# Patient Record
Sex: MALE | Race: WHITE | NOT HISPANIC OR LATINO | Employment: OTHER | URBAN - METROPOLITAN AREA
[De-identification: names, ages, dates, MRNs, and addresses within clinical notes are randomized per-mention and may not be internally consistent; named-entity substitution may affect disease eponyms.]

---

## 2017-01-23 ENCOUNTER — CONSULT (OUTPATIENT)
Dept: ONCOLOGY | Facility: CLINIC | Age: 67
End: 2017-01-23

## 2017-01-23 ENCOUNTER — LAB (OUTPATIENT)
Dept: LAB | Facility: HOSPITAL | Age: 67
End: 2017-01-23

## 2017-01-23 VITALS
RESPIRATION RATE: 15 BRPM | DIASTOLIC BLOOD PRESSURE: 90 MMHG | TEMPERATURE: 98.2 F | SYSTOLIC BLOOD PRESSURE: 143 MMHG | WEIGHT: 211 LBS | HEART RATE: 84 BPM

## 2017-01-23 DIAGNOSIS — D72.828 OTHER ELEVATED WHITE BLOOD CELL (WBC) COUNT: Primary | ICD-10-CM

## 2017-01-23 PROBLEM — D72.829 LEUCOCYTOSIS: Status: ACTIVE | Noted: 2017-01-23

## 2017-01-23 LAB
BASOPHILS # BLD MANUAL: 0 10*3/MM3 (ref 0–0.2)
BASOPHILS NFR BLD AUTO: 0 % (ref 0–1)
CRP SERPL-MCNC: 3.6 MG/DL (ref 0–10)
DEPRECATED RDW RBC AUTO: 46.5 FL (ref 37–54)
EOSINOPHIL # BLD MANUAL: 0.42 10*3/MM3 (ref 0.1–0.3)
EOSINOPHIL NFR BLD MANUAL: 3 % (ref 0–3)
ERYTHROCYTE [DISTWIDTH] IN BLOOD BY AUTOMATED COUNT: 13.4 % (ref 11.3–14.5)
ERYTHROCYTE [SEDIMENTATION RATE] IN BLOOD: 19 MM/HR (ref 0–20)
HCT VFR BLD AUTO: 43.9 % (ref 38.9–50.9)
HGB BLD-MCNC: 15.1 G/DL (ref 13.1–17.5)
LDH SERPL-CCNC: 156 U/L (ref 120–246)
LYMPHOCYTES # BLD MANUAL: 3.35 10*3/MM3 (ref 0.6–4.8)
LYMPHOCYTES NFR BLD MANUAL: 24 % (ref 24–44)
LYMPHOCYTES NFR BLD MANUAL: 6 % (ref 0–12)
MCH RBC QN AUTO: 32.4 PG (ref 27–31)
MCHC RBC AUTO-ENTMCNC: 34.4 G/DL (ref 32–36)
MCV RBC AUTO: 94.2 FL (ref 80–99)
METAMYELOCYTES NFR BLD MANUAL: 1 % (ref 0–0)
MONOCYTES # BLD AUTO: 0.84 10*3/MM3 (ref 0–1)
NEUTROPHILS # BLD AUTO: 9.21 10*3/MM3 (ref 1.5–8.3)
NEUTROPHILS NFR BLD MANUAL: 64 % (ref 41–71)
NEUTS BAND NFR BLD MANUAL: 2 % (ref 0–5)
PLAT MORPH BLD: NORMAL
PLATELET # BLD AUTO: 310 10*3/MM3 (ref 150–450)
PMV BLD AUTO: 9.2 FL (ref 6–12)
RBC # BLD AUTO: 4.66 10*6/MM3 (ref 4.2–5.76)
RBC MORPH BLD: NORMAL
WBC MORPH BLD: NORMAL
WBC NRBC COR # BLD: 13.95 10*3/MM3 (ref 3.5–10.8)

## 2017-01-23 PROCEDURE — 85007 BL SMEAR W/DIFF WBC COUNT: CPT | Performed by: INTERNAL MEDICINE

## 2017-01-23 PROCEDURE — 85027 COMPLETE CBC AUTOMATED: CPT | Performed by: INTERNAL MEDICINE

## 2017-01-23 PROCEDURE — 83615 LACTATE (LD) (LDH) ENZYME: CPT | Performed by: INTERNAL MEDICINE

## 2017-01-23 PROCEDURE — 86140 C-REACTIVE PROTEIN: CPT | Performed by: INTERNAL MEDICINE

## 2017-01-23 PROCEDURE — 99203 OFFICE O/P NEW LOW 30 MIN: CPT | Performed by: INTERNAL MEDICINE

## 2017-01-23 PROCEDURE — 36415 COLL VENOUS BLD VENIPUNCTURE: CPT | Performed by: INTERNAL MEDICINE

## 2017-01-23 PROCEDURE — 85652 RBC SED RATE AUTOMATED: CPT | Performed by: INTERNAL MEDICINE

## 2017-01-23 RX ORDER — OMEPRAZOLE 40 MG/1
40 CAPSULE, DELAYED RELEASE ORAL DAILY
COMMUNITY
End: 2017-05-18 | Stop reason: DRUGHIGH

## 2017-01-23 RX ORDER — LISINOPRIL AND HYDROCHLOROTHIAZIDE 25; 20 MG/1; MG/1
1 TABLET ORAL DAILY
COMMUNITY
End: 2017-05-18 | Stop reason: DRUGHIGH

## 2017-01-23 RX ORDER — ASPIRIN 81 MG/1
325 TABLET ORAL DAILY
COMMUNITY

## 2017-01-23 RX ORDER — TADALAFIL 10 MG/1
10 TABLET ORAL DAILY PRN
COMMUNITY

## 2017-01-23 NOTE — PROGRESS NOTES
Subjective     PROBLEM LIST:  1.  Leukocytosis/neutrophilia  2.  Hypertension    CHIEF COMPLAINT: Evaluate elevated white blood count      HISTORY OF PRESENT ILLNESS:  The patient is a 66 y.o. year old male, referred for persistent mild leukocytosis.  He's had a white count of 13,000 or slightly higher for over 1 year on a couple of different determinations.  He hasn't noticed any sweats, or unexplained weight loss, fevers, chills, or any nose bleeding, gum bleeding or unusual bruising.  He hasn't noted any rashes or itching or any joint redness or swelling.  He has experienced some mild discomfort at the base of his left thumb which is episodic but stable.  He's felt well and been maintaining his usual activities.  He hasn't had any recent medicine changes.  No cough wheezing or dyspnea.    REVIEW OF SYSTEMS:  A 14 point review of systems was performed and is negative except as noted above.    Past Medical History   Diagnosis Date   • Hypertension        No current outpatient prescriptions on file prior to visit.     No current facility-administered medications on file prior to visit.        No Known Allergies    Past Surgical History   Procedure Laterality Date   • Tonsillectomy     • Popliteal venous aneurysm repair  2012       Social History     Social History   • Marital status:      Spouse name: N/A   • Number of children: N/A   • Years of education: N/A     Social History Main Topics   • Smoking status: Former Smoker     Quit date: 2002   • Smokeless tobacco: None   • Alcohol use Yes   • Drug use: Yes     Special: Marijuana   • Sexual activity: Not Asked     Other Topics Concern   • None     Social History Narrative   Retired, formerly self-employed.  He lives in [place] with his wife.    Family History   Problem Relation Age of Onset   • Cancer Mother    • Cancer Sister        Objective     Visit Vitals   • /90   • Pulse 84   • Temp 98.2 °F (36.8 °C) (Temporal Artery )   • Resp 15   • Wt  211 lb (95.7 kg)     Performance Status: ECOG 0  General: well appearing male in no acute distress  Neuro: alert and oriented, normal gait and station  HEENT: sclera anicteric, oropharynx clear, no ulcers or candida and I could not feel any thyroid enlargement  Lymphatics: no cervical, supraclavicular, or axillary adenopathy  Cardiovascular: regular rate and rhythm, no murmurs  Lungs: clear to auscultation bilaterally  Abdomen: soft, nontender, nondistended.  No palpable organomegaly  Extremeties: no lower extremity edema  Skin: no rashes, lesions, bruising, or petechiae  Psych: mood and affect appropriate  Joints: No erythema or effusion      Labs: Labs done by Dr. Gunter on 12/19/2016 showed white blood count 13 with absolute neutrophil count mildly elevated at 9.1 with normal absolute lymphocyte count.  Hemoglobin was normal at 15 with a normal MCV and platelets were normal at 291.   Labs from 2010 showed on 3/14/2010 white count 13.05 with normal hemoglobin and platelets.  This was done just before his vascular surgery.  2 days following this the white count increased to 19.4 with hemoglobin having fallen to 13.1 after his procedure.      Assessment/Plan     Chad Ohara is a 66 y.o. year old male with a mild but persistent leukocytosis.  Fortunately, he hasn't shown progressive leukocytosis as I would expect with any of the myeloproliferative disorders, even polycythemia vera.  In addition, he doesn't have any other symptoms or any splenomegaly.  Lastly, he hasn't developed any anemia which again would fit with things such as CML or CMML.  This is probably a benign neutrophilia, possibly a reactive process.    Plan: 1.  I will check a manual differential to look for any immature forms and we will do a sedimentation rate and CRP and LDH today.  2.  If these lab draws acceptable I'll check a CBC again in about 3 weeks and see him back in about 6 weeks.  If he isn't showing any cycling up or down, we can probably  just observe this for now.  If he shows a progressive rise in his leukocytosis or any immature forms we can begin investigating further such as doing BCR-ABL testing for CML and related disorders or doing a JESSICA 2 and related testing for possible polycythemia vera and decide then about any utility for a bone marrow biopsy.  He is comfortable with this conservative plan for now and I discussed this fully with him.      I'd like to thank Dr. Galloway for asking me to see this nice gentleman with him.           Tyrell Bone MD    1/23/2017

## 2017-01-23 NOTE — LETTER
January 23, 2017     JOYCELYN Galloway MD  [address] [address]    Patient: Chad Ohara   YOB: 1950   Date of Visit: 1/23/2017       Dear Dr. Laverne MD:    Thank you for referring Chad Ohara to me for evaluation. Below are the relevant portions of my assessment and plan of care.    If you have questions, please do not hesitate to call me. I look forward to following Chad along with you.         Sincerely,        Tyrell Bone MD        CC: No Recipients  Tyrell Bone MD  1/23/2017 10:04 AM  Signed    Subjective     PROBLEM LIST:  1.  Leukocytosis/neutrophilia  2.  Hypertension    CHIEF COMPLAINT: Evaluate elevated white blood count      HISTORY OF PRESENT ILLNESS:  The patient is a 66 y.o. year old male, referred for persistent mild leukocytosis.  He's had a white count of 13,000 or slightly higher for over 1 year on a couple of different determinations.  He hasn't noticed any sweats, or unexplained weight loss, fevers, chills, or any nose bleeding, gum bleeding or unusual bruising.  He hasn't noted any rashes or itching or any joint redness or swelling.  He has experienced some mild discomfort at the base of his left thumb which is episodic but stable.  He's felt well and been maintaining his usual activities.  He hasn't had any recent medicine changes.  No cough wheezing or dyspnea.    REVIEW OF SYSTEMS:  A 14 point review of systems was performed and is negative except as noted above.    Past Medical History   Diagnosis Date   • Hypertension        No current outpatient prescriptions on file prior to visit.     No current facility-administered medications on file prior to visit.        No Known Allergies    Past Surgical History   Procedure Laterality Date   • Tonsillectomy     • Popliteal venous aneurysm repair  2012       Social History     Social History   • Marital status:      Spouse name: N/A   • Number of children: N/A   • Years of  education: N/A     Social History Main Topics   • Smoking status: Former Smoker     Quit date: 2002   • Smokeless tobacco: None   • Alcohol use Yes   • Drug use: Yes     Special: Marijuana   • Sexual activity: Not Asked     Other Topics Concern   • None     Social History Narrative   Retired, formerly self-employed.  He lives in [place] with his wife.    Family History   Problem Relation Age of Onset   • Cancer Mother    • Cancer Sister        Objective     Visit Vitals   • /90   • Pulse 84   • Temp 98.2 °F (36.8 °C) (Temporal Artery )   • Resp 15   • Wt 211 lb (95.7 kg)     Performance Status: ECOG 0  General: well appearing male in no acute distress  Neuro: alert and oriented, normal gait and station  HEENT: sclera anicteric, oropharynx clear, no ulcers or candida and I could not feel any thyroid enlargement  Lymphatics: no cervical, supraclavicular, or axillary adenopathy  Cardiovascular: regular rate and rhythm, no murmurs  Lungs: clear to auscultation bilaterally  Abdomen: soft, nontender, nondistended.  No palpable organomegaly  Extremeties: no lower extremity edema  Skin: no rashes, lesions, bruising, or petechiae  Psych: mood and affect appropriate  Joints: No erythema or effusion      Labs: Labs done by Dr. Gunter on 12/19/2016 showed white blood count 13 with absolute neutrophil count mildly elevated at 9.1 with normal absolute lymphocyte count.  Hemoglobin was normal at 15 with a normal MCV and platelets were normal at 291.   Labs from 2010 showed on 3/14/2010 white count 13.05 with normal hemoglobin and platelets.  This was done just before his vascular surgery.  2 days following this the white count increased to 19.4 with hemoglobin having fallen to 13.1 after his procedure.      Assessment/Plan     Chad Ohara is a 66 y.o. year old male with a mild but persistent leukocytosis.  Fortunately, he hasn't shown progressive leukocytosis as I would expect with any of the myeloproliferative  disorders, even polycythemia vera.  In addition, he doesn't have any other symptoms or any splenomegaly.  Lastly, he hasn't developed any anemia which again would fit with things such as CML or CMML.  This is probably a benign neutrophilia, possibly a reactive process.    Plan: 1.  I will check a manual differential to look for any immature forms and we will do a sedimentation rate and CRP and LDH today.  2.  If these lab draws acceptable I'll check a CBC again in about 3 weeks and see him back in about 6 weeks.  If he isn't showing any cycling up or down, we can probably just observe this for now.  If he shows a progressive rise in his leukocytosis or any immature forms we can begin investigating further such as doing BCR-ABL testing for CML and related disorders or doing a JESSICA 2 and related testing for possible polycythemia vera and decide then about any utility for a bone marrow biopsy.  He is comfortable with this conservative plan for now and I discussed this fully with him.      I'd like to thank Dr. Galloway for asking me to see this nice gentleman with him.           Tyrell Bone MD    1/23/2017

## 2017-02-13 ENCOUNTER — LAB (OUTPATIENT)
Dept: LAB | Facility: HOSPITAL | Age: 67
End: 2017-02-13

## 2017-02-13 DIAGNOSIS — D72.828 OTHER ELEVATED WHITE BLOOD CELL (WBC) COUNT: ICD-10-CM

## 2017-02-13 LAB
BASOPHILS # BLD AUTO: 0.03 10*3/MM3 (ref 0–0.2)
BASOPHILS NFR BLD AUTO: 0.2 % (ref 0–1)
DEPRECATED RDW RBC AUTO: 47.7 FL (ref 37–54)
EOSINOPHIL # BLD AUTO: 0.1 10*3/MM3 (ref 0.1–0.3)
EOSINOPHIL NFR BLD AUTO: 0.8 % (ref 0–3)
ERYTHROCYTE [DISTWIDTH] IN BLOOD BY AUTOMATED COUNT: 13.8 % (ref 11.3–14.5)
HCT VFR BLD AUTO: 41.2 % (ref 38.9–50.9)
HGB BLD-MCNC: 13.9 G/DL (ref 13.1–17.5)
IMM GRANULOCYTES # BLD: 0.14 10*3/MM3 (ref 0–0.03)
IMM GRANULOCYTES NFR BLD: 1.1 % (ref 0–0.6)
LYMPHOCYTES # BLD AUTO: 3.14 10*3/MM3 (ref 0.6–4.8)
LYMPHOCYTES NFR BLD AUTO: 24.4 % (ref 24–44)
MCH RBC QN AUTO: 32 PG (ref 27–31)
MCHC RBC AUTO-ENTMCNC: 33.7 G/DL (ref 32–36)
MCV RBC AUTO: 94.9 FL (ref 80–99)
MONOCYTES # BLD AUTO: 1.09 10*3/MM3 (ref 0–1)
MONOCYTES NFR BLD AUTO: 8.5 % (ref 0–12)
NEUTROPHILS # BLD AUTO: 8.35 10*3/MM3 (ref 1.5–8.3)
NEUTROPHILS NFR BLD AUTO: 65 % (ref 41–71)
PLATELET # BLD AUTO: 321 10*3/MM3 (ref 150–450)
PMV BLD AUTO: 9.7 FL (ref 6–12)
RBC # BLD AUTO: 4.34 10*6/MM3 (ref 4.2–5.76)
WBC NRBC COR # BLD: 12.85 10*3/MM3 (ref 3.5–10.8)

## 2017-02-13 PROCEDURE — 36415 COLL VENOUS BLD VENIPUNCTURE: CPT

## 2017-02-13 PROCEDURE — 85025 COMPLETE CBC W/AUTO DIFF WBC: CPT | Performed by: INTERNAL MEDICINE

## 2017-03-06 ENCOUNTER — OFFICE VISIT (OUTPATIENT)
Dept: ONCOLOGY | Facility: CLINIC | Age: 67
End: 2017-03-06

## 2017-03-06 ENCOUNTER — APPOINTMENT (OUTPATIENT)
Dept: LAB | Facility: HOSPITAL | Age: 67
End: 2017-03-06

## 2017-03-06 VITALS
SYSTOLIC BLOOD PRESSURE: 176 MMHG | WEIGHT: 212 LBS | RESPIRATION RATE: 16 BRPM | DIASTOLIC BLOOD PRESSURE: 78 MMHG | HEART RATE: 87 BPM | TEMPERATURE: 97.7 F

## 2017-03-06 DIAGNOSIS — D72.829 LEUKOCYTOSIS, UNSPECIFIED TYPE: Primary | ICD-10-CM

## 2017-03-06 LAB
ERYTHROCYTE [DISTWIDTH] IN BLOOD BY AUTOMATED COUNT: 13.7 % (ref 11.3–14.5)
HCT VFR BLD AUTO: 39.8 % (ref 38.9–50.9)
HGB BLD-MCNC: 13.1 G/DL (ref 13.1–17.5)
LYMPHOCYTES # BLD AUTO: 2.9 10*3/MM3 (ref 0.6–4.8)
LYMPHOCYTES NFR BLD AUTO: 21.3 % (ref 24–44)
MCH RBC QN AUTO: 30.9 PG (ref 27–31)
MCHC RBC AUTO-ENTMCNC: 32.9 G/DL (ref 32–36)
MCV RBC AUTO: 94.1 FL (ref 80–99)
MONOCYTES # BLD AUTO: 0.9 10*3/MM3 (ref 0–1)
MONOCYTES NFR BLD AUTO: 6.5 % (ref 0–12)
NEUTROPHILS # BLD AUTO: 9.8 10*3/MM3 (ref 1.5–8.3)
NEUTROPHILS NFR BLD AUTO: 72.2 % (ref 41–71)
PLATELET # BLD AUTO: 266 10*3/MM3 (ref 150–450)
PMV BLD AUTO: 7.2 FL (ref 6–12)
RBC # BLD AUTO: 4.23 10*6/MM3 (ref 4.2–5.76)
WBC NRBC COR # BLD: 13.6 10*3/MM3 (ref 3.5–10.8)

## 2017-03-06 PROCEDURE — 36415 COLL VENOUS BLD VENIPUNCTURE: CPT | Performed by: NURSE PRACTITIONER

## 2017-03-06 PROCEDURE — 99213 OFFICE O/P EST LOW 20 MIN: CPT | Performed by: NURSE PRACTITIONER

## 2017-03-06 PROCEDURE — 85025 COMPLETE CBC W/AUTO DIFF WBC: CPT | Performed by: NURSE PRACTITIONER

## 2017-03-06 NOTE — PROGRESS NOTES
PROBLEM LIST:  1. Leukocytosis/neutrophilia  2. Hypertension     CHIEF COMPLAINT: follow up for elevated white blood count    Subjective     HISTORY OF PRESENT ILLNESS:   Mr. Ohara is here for follow up evaluation of persistent mild leukocytosis. He denies any sweats, unintentional weight loss, recurring fevers, severe, fatigue, nose or gum bleeding, rashes, itching or joint swelling. He maintains his usual daily activities.     Past Medical History, Past Surgical History, Social History, Family History have been reviewed and are without significant changes except as mentioned.    Review of Systems   A comprehensive 14 point review of systems was performed and was negative except as mentioned.    Medications:  The current medication list was reviewed in the EMR    ALLERGIES:  No Known Allergies    Objective      Visit Vitals   • /78   • Pulse 87   • Temp 97.7 °F (36.5 °C) (Temporal Artery )   • Resp 16   • Wt 212 lb (96.2 kg)            General: well appearing, in no acute distress   HEENT: sclera anicteric, oropharynx clear  Lymphatics: no cervical, supraclavicular, or axillary adenopathy  Cardiovascular: regular rate and rhythm, no murmurs  Lungs: clear to auscultation bilaterally  Abdomen: soft, nontender, nondistended.  No palpable masses or organomegaly  Extremeties: no lower extremity edema, cords or calf tenderness  Skin: no rashes, lesions, bruising, or petechiae    RECENT LABS:  Hematology WBC   Date Value Ref Range Status   02/13/2017 12.85 (H) 3.50 - 10.80 10*3/mm3 Final     HEMOGLOBIN   Date Value Ref Range Status   02/13/2017 13.9 13.1 - 17.5 g/dL Final     HEMATOCRIT   Date Value Ref Range Status   02/13/2017 41.2 38.9 - 50.9 % Final     MCV   Date Value Ref Range Status   02/13/2017 94.9 80.0 - 99.0 fL Final     RDW   Date Value Ref Range Status   02/13/2017 13.8 11.3 - 14.5 % Final     MPV   Date Value Ref Range Status   02/13/2017 9.7 6.0 - 12.0 fL Final     PLATELETS   Date Value Ref  Range Status   02/13/2017 321 150 - 450 10*3/mm3 Final     IMMATURE GRANS %   Date Value Ref Range Status   02/13/2017 1.1 (H) 0.0 - 0.6 % Final     NEUTROPHILS, ABSOLUTE   Date Value Ref Range Status   02/13/2017 8.35 (H) 1.50 - 8.30 10*3/mm3 Final     NEUTROPHILS ABSOLUTE   Date Value Ref Range Status   01/23/2017 9.21 (H) 1.50 - 8.30 10*3/mm3 Final     LYMPHOCYTES, ABSOLUTE   Date Value Ref Range Status   02/13/2017 3.14 0.60 - 4.80 10*3/mm3 Final     MONOCYTES, ABSOLUTE   Date Value Ref Range Status   02/13/2017 1.09 (H) 0.00 - 1.00 10*3/mm3 Final     EOSINOPHILS, ABSOLUTE   Date Value Ref Range Status   02/13/2017 0.10 0.10 - 0.30 10*3/mm3 Final     EOSINOPHILS ABSOLUTE   Date Value Ref Range Status   01/23/2017 0.42 (H) 0.10 - 0.30 10*3/mm3 Final     BASOPHILS, ABSOLUTE   Date Value Ref Range Status   02/13/2017 0.03 0.00 - 0.20 10*3/mm3 Final     BASOPHILS ABSOLUTE   Date Value Ref Range Status   01/23/2017 0.00 0.00 - 0.20 10*3/mm3 Final     IMMATURE GRANS, ABSOLUTE   Date Value Ref Range Status   02/13/2017 0.14 (H) 0.00 - 0.03 10*3/mm3 Final       No results found for: GLUCOSE, NA, K, CO2, CL, ANIONGAP, CREATININE, BUN, BCR, CALCIUM, EGFRIFNONA, ALKPHOS, PROTEINTOT, ALT, AST, BILITOT, ALBUMIN, GLOB, LABIL2    LDH   Date Value Ref Range Status   01/23/2017 156 120 - 246 U/L Final          Assessment/Plan   Impression:   1. Mild but persistent leukocytosis. Fortunately, he hasn't shown progressive leukocytosis as I would expect with any of the myeloproliferative disorders, even polycythemia vera. In addition, he doesn't have any other symptoms or any splenomegaly. Lastly, he hasn't developed any anemia which again would fit with things such as CML or CMML. This is probably a benign neutrophilia, possibly a reactive process.     Plan:   1. We will obtain a CBC today, 3 months and on return in 6 months. We will contact him if adversely changed.   2. We will see him back in 6 months for follow up evaluation. He  has been instructed to contact us in the interm if any new symptoms arise.              Karen Lin APRSLADE  [facility]    3/6/2017

## 2017-05-18 ENCOUNTER — OFFICE VISIT (OUTPATIENT)
Dept: CARDIOLOGY | Facility: HOSPITAL | Age: 67
End: 2017-05-18

## 2017-05-18 ENCOUNTER — HOSPITAL ENCOUNTER (OUTPATIENT)
Dept: CARDIOLOGY | Facility: HOSPITAL | Age: 67
Discharge: HOME | End: 2017-05-18
Admitting: NURSE PRACTITIONER

## 2017-05-18 VITALS
TEMPERATURE: 98.2 F | BODY MASS INDEX: 27.08 KG/M2 | WEIGHT: 211 LBS | RESPIRATION RATE: 16 BRPM | HEART RATE: 80 BPM | HEIGHT: 74 IN | OXYGEN SATURATION: 100 % | DIASTOLIC BLOOD PRESSURE: 103 MMHG | SYSTOLIC BLOOD PRESSURE: 148 MMHG

## 2017-05-18 DIAGNOSIS — R00.2 PALPITATIONS: Primary | ICD-10-CM

## 2017-05-18 DIAGNOSIS — R00.2 PALPITATIONS: ICD-10-CM

## 2017-05-18 DIAGNOSIS — I10 ESSENTIAL HYPERTENSION: ICD-10-CM

## 2017-05-18 PROCEDURE — 99215 OFFICE O/P EST HI 40 MIN: CPT | Performed by: NURSE PRACTITIONER

## 2017-05-18 PROCEDURE — 0296T HC EXT ECG > 48HR TO 21 DAY RCRD W/CONECT INTL RCRD: CPT

## 2017-05-18 RX ORDER — UBIDECARENONE 100 MG
100 CAPSULE ORAL DAILY
COMMUNITY

## 2017-05-18 RX ORDER — LISINOPRIL AND HYDROCHLOROTHIAZIDE 12.5; 1 MG/1; MG/1
1 TABLET ORAL DAILY
COMMUNITY

## 2017-05-19 ENCOUNTER — DOCUMENTATION (OUTPATIENT)
Dept: CARDIOLOGY | Facility: HOSPITAL | Age: 67
End: 2017-05-19

## 2017-05-19 ENCOUNTER — APPOINTMENT (OUTPATIENT)
Dept: CARDIOLOGY | Facility: HOSPITAL | Age: 67
End: 2017-05-19

## 2017-05-24 ENCOUNTER — HOSPITAL ENCOUNTER (OUTPATIENT)
Dept: CARDIOLOGY | Facility: HOSPITAL | Age: 67
Discharge: HOME | End: 2017-05-24
Admitting: NURSE PRACTITIONER

## 2017-05-24 ENCOUNTER — APPOINTMENT (OUTPATIENT)
Dept: LAB | Facility: HOSPITAL | Age: 67
End: 2017-05-24

## 2017-05-24 VITALS
BODY MASS INDEX: 27.08 KG/M2 | SYSTOLIC BLOOD PRESSURE: 165 MMHG | DIASTOLIC BLOOD PRESSURE: 110 MMHG | WEIGHT: 211 LBS | HEIGHT: 74 IN

## 2017-05-24 DIAGNOSIS — R00.2 PALPITATIONS: ICD-10-CM

## 2017-05-24 LAB
ALBUMIN SERPL-MCNC: 4.6 G/DL (ref 3.2–4.8)
ALBUMIN/GLOB SERPL: 1.4 G/DL (ref 1.5–2.5)
ALP SERPL-CCNC: 98 U/L (ref 25–100)
ALT SERPL W P-5'-P-CCNC: 31 U/L (ref 7–40)
ANION GAP SERPL CALCULATED.3IONS-SCNC: 0 MMOL/L (ref 3–11)
AST SERPL-CCNC: 35 U/L (ref 0–33)
BH CV ECHO MEAS - AO MAX PG (FULL): 4.1 MMHG
BH CV ECHO MEAS - AO MAX PG: 7.5 MMHG
BH CV ECHO MEAS - AO MEAN PG (FULL): 2.5 MMHG
BH CV ECHO MEAS - AO MEAN PG: 4.4 MMHG
BH CV ECHO MEAS - AO ROOT AREA (BSA CORRECTED): 1.5
BH CV ECHO MEAS - AO ROOT AREA: 8.8 CM^2
BH CV ECHO MEAS - AO ROOT DIAM: 3.3 CM
BH CV ECHO MEAS - AO V2 MAX: 136.8 CM/SEC
BH CV ECHO MEAS - AO V2 MEAN: 102.4 CM/SEC
BH CV ECHO MEAS - AO V2 VTI: 30 CM
BH CV ECHO MEAS - ASC AORTA: 3.2 CM
BH CV ECHO MEAS - AVA(I,A): 2.4 CM^2
BH CV ECHO MEAS - AVA(I,D): 2.4 CM^2
BH CV ECHO MEAS - AVA(V,A): 2.4 CM^2
BH CV ECHO MEAS - AVA(V,D): 2.4 CM^2
BH CV ECHO MEAS - BSA(HAYCOCK): 2.2 M^2
BH CV ECHO MEAS - BSA: 2.2 M^2
BH CV ECHO MEAS - BZI_BMI: 27.1 KILOGRAMS/M^2
BH CV ECHO MEAS - BZI_METRIC_HEIGHT: 188 CM
BH CV ECHO MEAS - BZI_METRIC_WEIGHT: 95.7 KG
BH CV ECHO MEAS - CONTRAST EF (2CH): 48.1 ML/M^2
BH CV ECHO MEAS - CONTRAST EF 4CH: 56.3 ML/M^2
BH CV ECHO MEAS - EDV(CUBED): 231.1 ML
BH CV ECHO MEAS - EDV(MOD-SP2): 154 ML
BH CV ECHO MEAS - EDV(MOD-SP4): 142 ML
BH CV ECHO MEAS - EDV(TEICH): 189.5 ML
BH CV ECHO MEAS - EF(CUBED): 79.2 %
BH CV ECHO MEAS - EF(MOD-SP2): 48.1 %
BH CV ECHO MEAS - EF(MOD-SP4): 56.3 %
BH CV ECHO MEAS - EF(TEICH): 70.6 %
BH CV ECHO MEAS - ESV(CUBED): 48 ML
BH CV ECHO MEAS - ESV(MOD-SP2): 80 ML
BH CV ECHO MEAS - ESV(MOD-SP4): 62 ML
BH CV ECHO MEAS - ESV(TEICH): 55.7 ML
BH CV ECHO MEAS - FS: 40.8 %
BH CV ECHO MEAS - IVS/LVPW: 0.85
BH CV ECHO MEAS - IVSD: 0.86 CM
BH CV ECHO MEAS - LA DIMENSION: 4.4 CM
BH CV ECHO MEAS - LA/AO: 1.3
BH CV ECHO MEAS - LAT PEAK E' VEL: 12.6 CM/SEC
BH CV ECHO MEAS - LV DIASTOLIC VOL/BSA (35-75): 63.9 ML/M^2
BH CV ECHO MEAS - LV MASS(C)D: 236.5 GRAMS
BH CV ECHO MEAS - LV MASS(C)DI: 106.4 GRAMS/M^2
BH CV ECHO MEAS - LV MAX PG: 3.4 MMHG
BH CV ECHO MEAS - LV MEAN PG: 1.9 MMHG
BH CV ECHO MEAS - LV SYSTOLIC VOL/BSA (12-30): 27.9 ML/M^2
BH CV ECHO MEAS - LV V1 MAX: 92.3 CM/SEC
BH CV ECHO MEAS - LV V1 MEAN: 64.9 CM/SEC
BH CV ECHO MEAS - LV V1 VTI: 20.9 CM
BH CV ECHO MEAS - LVIDD: 6.1 CM
BH CV ECHO MEAS - LVIDS: 3.6 CM
BH CV ECHO MEAS - LVLD AP2: 9.6 CM
BH CV ECHO MEAS - LVLD AP4: 9.1 CM
BH CV ECHO MEAS - LVLS AP2: 7.7 CM
BH CV ECHO MEAS - LVLS AP4: 7.4 CM
BH CV ECHO MEAS - LVOT AREA (M): 3.5 CM^2
BH CV ECHO MEAS - LVOT AREA: 3.5 CM^2
BH CV ECHO MEAS - LVOT DIAM: 2.1 CM
BH CV ECHO MEAS - LVPWD: 1 CM
BH CV ECHO MEAS - MED PEAK E' VEL: 11.7 CM/SEC
BH CV ECHO MEAS - MV A MAX VEL: 103.2 CM/SEC
BH CV ECHO MEAS - MV DEC TIME: 0.16 SEC
BH CV ECHO MEAS - MV E MAX VEL: 93.8 CM/SEC
BH CV ECHO MEAS - MV E/A: 0.91
BH CV ECHO MEAS - PA ACC SLOPE: 692.9 CM/SEC^2
BH CV ECHO MEAS - PA ACC TIME: 0.11 SEC
BH CV ECHO MEAS - PA MAX PG: 4.2 MMHG
BH CV ECHO MEAS - PA PR(ACCEL): 30.3 MMHG
BH CV ECHO MEAS - PA V2 MAX: 102.6 CM/SEC
BH CV ECHO MEAS - PULM A REVS VEL: 24.7 CM/SEC
BH CV ECHO MEAS - PULM DIAS VEL: 55 CM/SEC
BH CV ECHO MEAS - PULM S/D: 1.2
BH CV ECHO MEAS - PULM SYS VEL: 68 CM/SEC
BH CV ECHO MEAS - RAP SYSTOLE: 3 MMHG
BH CV ECHO MEAS - RVDD: 3.2 CM
BH CV ECHO MEAS - RVSP: 27.9 MMHG
BH CV ECHO MEAS - SI(AO): 118.4 ML/M^2
BH CV ECHO MEAS - SI(CUBED): 82.3 ML/M^2
BH CV ECHO MEAS - SI(LVOT): 33 ML/M^2
BH CV ECHO MEAS - SI(MOD-SP2): 33.3 ML/M^2
BH CV ECHO MEAS - SI(MOD-SP4): 36 ML/M^2
BH CV ECHO MEAS - SI(TEICH): 60.2 ML/M^2
BH CV ECHO MEAS - SV(AO): 263.2 ML
BH CV ECHO MEAS - SV(CUBED): 183 ML
BH CV ECHO MEAS - SV(LVOT): 73.4 ML
BH CV ECHO MEAS - SV(MOD-SP2): 74 ML
BH CV ECHO MEAS - SV(MOD-SP4): 80 ML
BH CV ECHO MEAS - SV(TEICH): 133.8 ML
BH CV ECHO MEAS - TAPSE (>1.6): 2.6 CM2
BH CV ECHO MEAS - TR MAX VEL: 249.3 CM/SEC
BH CV XLRA - RV BASE: 4.6 CM
BH CV XLRA - RV LENGTH: 7.5 CM
BH CV XLRA - RV MID: 4.5 CM
BH CV XLRA - TDI S': 12.7 CM/SEC
BILIRUB SERPL-MCNC: 0.4 MG/DL (ref 0.3–1.2)
BUN BLD-MCNC: 18 MG/DL (ref 9–23)
BUN/CREAT SERPL: 18 (ref 7–25)
CALCIUM SPEC-SCNC: 10.3 MG/DL (ref 8.7–10.4)
CHLORIDE SERPL-SCNC: 105 MMOL/L (ref 99–109)
CO2 SERPL-SCNC: 36 MMOL/L (ref 20–31)
CREAT BLD-MCNC: 1 MG/DL (ref 0.6–1.3)
E/E' RATIO: 7.8
GFR SERPL CREATININE-BSD FRML MDRD: 74 ML/MIN/1.73
GLOBULIN UR ELPH-MCNC: 3.3 GM/DL
GLUCOSE BLD-MCNC: 100 MG/DL (ref 70–100)
LEFT ATRIUM VOLUME INDEX: 29.3 ML/M2
LEFT ATRIUM VOLUME: 65 CM3
LV EF 2D ECHO EST: 55 %
MAGNESIUM SERPL-MCNC: 2.4 MG/DL (ref 1.3–2.7)
POTASSIUM BLD-SCNC: 5.2 MMOL/L (ref 3.5–5.5)
PROT SERPL-MCNC: 7.9 G/DL (ref 5.7–8.2)
SODIUM BLD-SCNC: 141 MMOL/L (ref 132–146)
T4 FREE SERPL-MCNC: 1.09 NG/DL (ref 0.89–1.76)
TSH SERPL DL<=0.05 MIU/L-ACNC: 3.16 MIU/ML (ref 0.35–5.35)

## 2017-05-24 PROCEDURE — 84439 ASSAY OF FREE THYROXINE: CPT | Performed by: NURSE PRACTITIONER

## 2017-05-24 PROCEDURE — 93306 TTE W/DOPPLER COMPLETE: CPT | Performed by: INTERNAL MEDICINE

## 2017-05-24 PROCEDURE — 84443 ASSAY THYROID STIM HORMONE: CPT | Performed by: NURSE PRACTITIONER

## 2017-05-24 PROCEDURE — 36415 COLL VENOUS BLD VENIPUNCTURE: CPT | Performed by: NURSE PRACTITIONER

## 2017-05-24 PROCEDURE — 80053 COMPREHEN METABOLIC PANEL: CPT | Performed by: NURSE PRACTITIONER

## 2017-05-24 PROCEDURE — 93306 TTE W/DOPPLER COMPLETE: CPT

## 2017-05-24 PROCEDURE — 83735 ASSAY OF MAGNESIUM: CPT | Performed by: NURSE PRACTITIONER

## 2017-06-09 ENCOUNTER — OFFICE VISIT (OUTPATIENT)
Dept: CARDIOLOGY | Facility: CLINIC | Age: 67
End: 2017-06-09

## 2017-06-09 DIAGNOSIS — R00.2 PALPITATIONS: ICD-10-CM

## 2017-06-09 PROCEDURE — 0298T PR EXT ECG > 48HR TO 21 DAY REVIEW AND INTERPRETATN: CPT | Performed by: INTERNAL MEDICINE

## 2017-06-20 ENCOUNTER — OFFICE VISIT (OUTPATIENT)
Dept: CARDIOLOGY | Facility: HOSPITAL | Age: 67
End: 2017-06-20

## 2017-06-20 VITALS
RESPIRATION RATE: 16 BRPM | HEIGHT: 73 IN | TEMPERATURE: 97.8 F | SYSTOLIC BLOOD PRESSURE: 143 MMHG | BODY MASS INDEX: 27.8 KG/M2 | HEART RATE: 90 BPM | WEIGHT: 209.8 LBS | OXYGEN SATURATION: 98 % | DIASTOLIC BLOOD PRESSURE: 99 MMHG

## 2017-06-20 DIAGNOSIS — R53.83 FATIGUE, UNSPECIFIED TYPE: ICD-10-CM

## 2017-06-20 DIAGNOSIS — I10 ESSENTIAL HYPERTENSION: ICD-10-CM

## 2017-06-20 DIAGNOSIS — I47.29 NSVT (NONSUSTAINED VENTRICULAR TACHYCARDIA) (HCC): ICD-10-CM

## 2017-06-20 DIAGNOSIS — R00.2 PALPITATIONS: Primary | ICD-10-CM

## 2017-06-20 DIAGNOSIS — I49.1 PAC (PREMATURE ATRIAL CONTRACTION): ICD-10-CM

## 2017-06-20 DIAGNOSIS — R94.31 ABNORMAL FINDING ON EKG: ICD-10-CM

## 2017-06-20 PROBLEM — I47.20 VT (VENTRICULAR TACHYCARDIA) (HCC): Status: ACTIVE | Noted: 2017-06-20

## 2017-06-20 PROCEDURE — 99213 OFFICE O/P EST LOW 20 MIN: CPT | Performed by: NURSE PRACTITIONER

## 2017-06-20 RX ORDER — METOPROLOL SUCCINATE 25 MG/1
25 TABLET, EXTENDED RELEASE ORAL DAILY
Qty: 30 TABLET | Refills: 3 | Status: SHIPPED | OUTPATIENT
Start: 2017-06-20 | End: 2017-08-04 | Stop reason: SDUPTHER

## 2017-06-20 NOTE — PROGRESS NOTES
[facility]  [facility]      Encounter Date:06/20/2017     Chad Ohara  [address]  [facility] [address]  [phone]    1950    JOYCELYN Galloway MD    Chad Ohara is a 67 y.o. male.      Subjective:     Chief Complaint:  Follow-up (4 week f/u (palpitations, HTN))       HPI     F/u on palpitations and recent Zio monitor and echo results.    In November (2016) started noticing increased blood pressure (170s). Reported EKG with PCP ok. Holter monitor ok. Started on BP meds (March 2017). Starting feel week. Cut meds in half with fatigue and weakness improved. Then started to notice racing HR and palpitations worse (more often since March). Hx of occasional skipped beats that pt states are PACs.  Worse at night, but occurring more frequent. Some days worse then others. Stopped stimulants (slight improvement initially), but started again. Denies CP, pressure, dizziness, syncope, edema, dyspnea with exertion or at rest. Slight SOB with palps, but more anxious and restless feeling (need to move or cough to make them stop). Hx of normal stress test 10 years ago. Currently on Lisinopril/HCTZ daily with reported home BP log 120-140s/60-70's.     Chief complaint today fatigue moderate to severe over the last week months, progressive.   associated with palpitations.  Reports daytime fatigue, waking 3 times per night to urinate, can nap easily.  Denies snoring or apneic periods.  No history of sleep evaluation.    Cardiac risk factors:  History, hypertension,  Tobacco use (remote),  gender, age (older than 55 for men, 65 for women)      Patient Active Problem List    Diagnosis   • PAC (premature atrial contraction) [I49.1]   • VT (ventricular tachycardia) [I47.2]   • Hypertension [I10]   • Palpitations [R00.2]     Overview Note:     ·  24-hour Holter monitor placed March 2017: Ventricular ectopies less than 1%, supraventricular ectopies less than 1%.  Minimal heart rate 54 bpm, average heart  rate 72 bpm  · EF 55%, RV cavity mildly dilated, mild TR, normal RVSP, negative for PFO  · Zio monitor (05/18/17-05/29/17);  average heart rate 78 bpm, normal heart rate 50 bpm, one 5 beat run VT, frequent PACs 7%, rare PVCs less than 1%       • Leucocytosis [D72.829]         Past Surgical History:   Procedure Laterality Date   • POPLITEAL VENOUS ANEURYSM REPAIR Left 2012   • TONSILLECTOMY         No Known Allergies      Current Outpatient Prescriptions:   •  aspirin 81 MG EC tablet, Take 81 mg by mouth Daily., Disp: , Rfl:   •  coenzyme Q10 100 MG capsule, Take 100 mg by mouth Daily., Disp: , Rfl:   •  lisinopril-hydrochlorothiazide (PRINZIDE,ZESTORETIC) 10-12.5 MG per tablet, Take 1 tablet by mouth Daily., Disp: , Rfl:   •  Multiple Vitamins-Minerals (CENTRUM SILVER PO), Take 1 tablet by mouth Daily., Disp: , Rfl:   •  tadalafil (CIALIS) 10 MG tablet, Take 10 mg by mouth Daily As Needed for erectile dysfunction., Disp: , Rfl:     The following portions of the patient's history were reviewed and updated as appropriate: allergies, current medications, past family history, past medical history, past social history, past surgical history and problem list.    Review of Systems   Constitution: Positive for malaise/fatigue. Negative for chills, decreased appetite, diaphoresis, fever, weakness, night sweats, weight gain and weight loss.   HENT: Negative for congestion, headaches and nosebleeds.    Eyes: Negative for blurred vision, visual disturbance and visual halos.   Cardiovascular: Positive for irregular heartbeat and palpitations. Negative for chest pain, claudication, cyanosis, dyspnea on exertion, leg swelling, near-syncope, orthopnea, paroxysmal nocturnal dyspnea and syncope.   Respiratory: Negative for cough, hemoptysis, shortness of breath, sleep disturbances due to breathing, snoring, sputum production and wheezing.    Endocrine: Negative for cold intolerance, heat intolerance, polydipsia, polyphagia and  "polyuria.   Hematologic/Lymphatic: Does not bruise/bleed easily.   Skin: Negative for dry skin, itching and rash.   Musculoskeletal: Negative for falls, joint pain, joint swelling, muscle weakness and myalgias.   Gastrointestinal: Positive for heartburn. Negative for bloating, abdominal pain, constipation, diarrhea, dysphagia, melena, nausea and vomiting.   Genitourinary: Negative for dysuria, flank pain, hematuria and nocturia.   Neurological: Negative for difficulty with concentration, excessive daytime sleepiness, dizziness and loss of balance.   Psychiatric/Behavioral: Negative for altered mental status and depression. The patient is not nervous/anxious.    Allergic/Immunologic: Negative for environmental allergies.       Objective:     Vitals:    06/20/17 0910 06/20/17 0913 06/20/17 0915   BP: 152/93 145/81 143/99   BP Location: Right arm Left arm Left arm   Patient Position: Sitting Sitting Standing   Pulse: 76  90   Resp: 16     Temp: 97.8 °F (36.6 °C)     TempSrc: Temporal Artery      SpO2: 98%     Weight: 209 lb 12.8 oz (95.2 kg)     Height: 73\" (185.4 cm)           Physical Exam   Constitutional: He is oriented to person, place, and time. He appears well-developed and well-nourished. No distress.   HENT:   Head: Normocephalic and atraumatic.   Mouth/Throat: Oropharynx is clear and moist.   Eyes: Conjunctivae are normal. Pupils are equal, round, and reactive to light. No scleral icterus.   Neck: No hepatojugular reflux and no JVD present. Carotid bruit is not present. No tracheal deviation present. No thyromegaly present.   Cardiovascular: Normal rate, regular rhythm, normal heart sounds and intact distal pulses.  Exam reveals no friction rub.    No murmur heard.  Pulmonary/Chest: Effort normal and breath sounds normal.   Abdominal: Soft. Bowel sounds are normal. He exhibits no distension. There is no tenderness.   Musculoskeletal: He exhibits no edema.   Lymphadenopathy:     He has no cervical adenopathy. "   Neurological: He is alert and oriented to person, place, and time.   Skin: Skin is warm, dry and intact. No rash noted. No cyanosis or erythema. No pallor.   Psychiatric: He has a normal mood and affect. His behavior is normal. Thought content normal.   Vitals reviewed.      Lab and Diagnostic Review:  · EF 55%, RV cavity mildly dilated, mild TR, normal RVSP, negative for PFO  · Zio monitor (05/18/17-05/29/17);  average heart rate 78 bpm, normal heart rate 50 bpm, one 5 beat run VT, frequent PACs 7%, rare PVCs less than 1%      Assessment and Plan:         1. Palpitations  With PAC, PVCs and NSVT    2. NSVT (nonsustained ventricular tachycardia)  Cardiolite GXT    3. PAC (premature atrial contraction)  7 Percent burden on Zio monitor  Will start Metoprolol Succinate 25 mg daily after stress completed.    4. Essential hypertension      5. Fatigue, unspecified type    F/u 8 weeks or sooner if needed.    *Please note that portions of this note were completed with a voice recognition program. Efforts were made to edit the dictations, but occasionally words are mistranscribed.

## 2017-06-27 ENCOUNTER — HOSPITAL ENCOUNTER (OUTPATIENT)
Dept: CARDIOLOGY | Facility: HOSPITAL | Age: 67
Discharge: HOME | End: 2017-06-27
Admitting: NURSE PRACTITIONER

## 2017-06-27 ENCOUNTER — HOSPITAL ENCOUNTER (OUTPATIENT)
Dept: CARDIOLOGY | Facility: HOSPITAL | Age: 67
Discharge: HOME | End: 2017-06-27

## 2017-06-27 VITALS
HEART RATE: 63 BPM | DIASTOLIC BLOOD PRESSURE: 88 MMHG | WEIGHT: 209 LBS | BODY MASS INDEX: 27.7 KG/M2 | SYSTOLIC BLOOD PRESSURE: 120 MMHG | HEIGHT: 73 IN

## 2017-06-27 DIAGNOSIS — I47.29 NSVT (NONSUSTAINED VENTRICULAR TACHYCARDIA) (HCC): ICD-10-CM

## 2017-06-27 DIAGNOSIS — R94.31 ABNORMAL FINDING ON EKG: ICD-10-CM

## 2017-06-27 DIAGNOSIS — R53.83 FATIGUE, UNSPECIFIED TYPE: ICD-10-CM

## 2017-06-27 LAB
BH CV NUCLEAR PRIOR STUDY: 3
BH CV STRESS BP STAGE 1: NORMAL
BH CV STRESS BP STAGE 2: NORMAL
BH CV STRESS BP STAGE 3: NORMAL
BH CV STRESS BP STAGE 4: NORMAL
BH CV STRESS DURATION MIN STAGE 1: 3
BH CV STRESS DURATION MIN STAGE 2: 3
BH CV STRESS DURATION MIN STAGE 3: 3
BH CV STRESS DURATION MIN STAGE 4: 2
BH CV STRESS DURATION SEC STAGE 1: 0
BH CV STRESS DURATION SEC STAGE 2: 0
BH CV STRESS DURATION SEC STAGE 3: 0
BH CV STRESS DURATION SEC STAGE 4: 2
BH CV STRESS GRADE STAGE 1: 10
BH CV STRESS GRADE STAGE 2: 12
BH CV STRESS GRADE STAGE 3: 14
BH CV STRESS GRADE STAGE 4: 16
BH CV STRESS HR STAGE 1: 99
BH CV STRESS HR STAGE 2: 117
BH CV STRESS HR STAGE 3: 133
BH CV STRESS HR STAGE 4: 153
BH CV STRESS METS STAGE 1: 5
BH CV STRESS METS STAGE 2: 7.5
BH CV STRESS METS STAGE 3: 10
BH CV STRESS METS STAGE 4: 13.5
BH CV STRESS O2 STAGE 1: 99
BH CV STRESS O2 STAGE 2: 98
BH CV STRESS O2 STAGE 3: 99
BH CV STRESS O2 STAGE 4: 96
BH CV STRESS PROTOCOL 1: NORMAL
BH CV STRESS RECOVERY BP: NORMAL MMHG
BH CV STRESS RECOVERY HR: 98 BPM
BH CV STRESS SPEED STAGE 1: 1.7
BH CV STRESS SPEED STAGE 2: 2.5
BH CV STRESS SPEED STAGE 3: 3.4
BH CV STRESS SPEED STAGE 4: 4.2
BH CV STRESS STAGE 1: 1
BH CV STRESS STAGE 2: 2
BH CV STRESS STAGE 3: 3
BH CV STRESS STAGE 4: 4
LV EF NUC BP: 74 %
MAXIMAL PREDICTED HEART RATE: 152 BPM
PERCENT MAX PREDICTED HR: 100.66 %
STRESS BASELINE BP: NORMAL MMHG
STRESS BASELINE HR: 60 BPM
STRESS PERCENT HR: 118 %
STRESS POST ESTIMATED WORKLOAD: 13.4 METS
STRESS POST EXERCISE DUR MIN: 11 MIN
STRESS POST EXERCISE DUR SEC: 2 SEC
STRESS POST PEAK BP: NORMAL MMHG
STRESS POST PEAK HR: 153 BPM
STRESS TARGET HR: 129 BPM

## 2017-06-27 PROCEDURE — 93018 CV STRESS TEST I&R ONLY: CPT | Performed by: INTERNAL MEDICINE

## 2017-06-27 PROCEDURE — 93017 CV STRESS TEST TRACING ONLY: CPT

## 2017-06-27 PROCEDURE — 0 TECHNETIUM SESTAMIBI: Performed by: NURSE PRACTITIONER

## 2017-06-27 PROCEDURE — A9500 TC99M SESTAMIBI: HCPCS | Performed by: NURSE PRACTITIONER

## 2017-06-27 PROCEDURE — 78452 HT MUSCLE IMAGE SPECT MULT: CPT

## 2017-06-27 PROCEDURE — 78452 HT MUSCLE IMAGE SPECT MULT: CPT | Performed by: INTERNAL MEDICINE

## 2017-06-27 RX ADMIN — Medication 1 DOSE: at 10:10

## 2017-06-27 RX ADMIN — Medication 1 DOSE: at 07:50

## 2017-08-04 ENCOUNTER — OFFICE VISIT (OUTPATIENT)
Dept: CARDIOLOGY | Facility: HOSPITAL | Age: 67
End: 2017-08-04

## 2017-08-04 VITALS
WEIGHT: 210.2 LBS | SYSTOLIC BLOOD PRESSURE: 133 MMHG | OXYGEN SATURATION: 98 % | DIASTOLIC BLOOD PRESSURE: 95 MMHG | BODY MASS INDEX: 27.86 KG/M2 | TEMPERATURE: 97.9 F | HEIGHT: 73 IN | RESPIRATION RATE: 16 BRPM | HEART RATE: 87 BPM

## 2017-08-04 DIAGNOSIS — I47.29 NSVT (NONSUSTAINED VENTRICULAR TACHYCARDIA) (HCC): ICD-10-CM

## 2017-08-04 DIAGNOSIS — I10 ESSENTIAL HYPERTENSION: ICD-10-CM

## 2017-08-04 DIAGNOSIS — R00.2 PALPITATIONS: Primary | ICD-10-CM

## 2017-08-04 DIAGNOSIS — I49.1 PAC (PREMATURE ATRIAL CONTRACTION): ICD-10-CM

## 2017-08-04 PROCEDURE — 99213 OFFICE O/P EST LOW 20 MIN: CPT | Performed by: NURSE PRACTITIONER

## 2017-08-04 RX ORDER — METOPROLOL SUCCINATE 25 MG/1
25 TABLET, EXTENDED RELEASE ORAL DAILY
Qty: 30 TABLET | Refills: 4 | Status: SHIPPED | OUTPATIENT
Start: 2017-08-04 | End: 2017-11-06 | Stop reason: SDUPTHER

## 2017-08-04 NOTE — PROGRESS NOTES
[facility]  [facility]      Encounter Date:08/04/2017     Chad Ohara  PO  [facility] [address]  [phone]    1950    JOYCELYN Galloway MD    Chad Ohara is a 67 y.o. male.      Subjective:     Chief Complaint:  Follow-up (6 week f/u  palpitations, and HTN)       HPI     Palpitations, intermittent, worse at night, > 3 months, but now improved with BB.  Able to sleep at night no concerns.  BP log has improved.  -140/ 70-90.  NO CP, pressure, dizziness,  Dyspnea, syncope, edema.  Continued ACE/HCTZ.  Recent normal stress/echo.      Patient Active Problem List    Diagnosis   • PAC (premature atrial contraction) [I49.1]   • NSVT (nonsustained ventricular tachycardia) [I47.2]   • Hypertension [I10]   • Palpitations [R00.2]     Overview Note:     ·  24-hour Holter monitor placed March 2017: Ventricular ectopies less than 1%, supraventricular ectopies less than 1%.  Minimal heart rate 54 bpm, average heart rate 72 bpm  · EF 55%, RV cavity mildly dilated, mild TR, normal RVSP, negative for PFO  · Zio monitor (05/18/17-05/29/17);  average heart rate 78 bpm, normal heart rate 50 bpm, one 5 beat run VT, frequent PACs 7%, rare PVCs less than 1%       • Leucocytosis [D72.829]         Past Surgical History:   Procedure Laterality Date   • POPLITEAL VENOUS ANEURYSM REPAIR Left 2012   • TONSILLECTOMY         No Known Allergies      Current Outpatient Prescriptions:   •  aspirin 81 MG EC tablet, Take 81 mg by mouth Daily., Disp: , Rfl:   •  coenzyme Q10 100 MG capsule, Take 100 mg by mouth Daily., Disp: , Rfl:   •  lisinopril-hydrochlorothiazide (PRINZIDE,ZESTORETIC) 10-12.5 MG per tablet, Take 1 tablet by mouth Daily., Disp: , Rfl:   •  metoprolol succinate XL (TOPROL-XL) 25 MG 24 hr tablet, Take 1 tablet by mouth Daily., Disp: 30 tablet, Rfl: 4  •  Multiple Vitamins-Minerals (CENTRUM SILVER PO), Take 1 tablet by mouth Daily., Disp: , Rfl:   •  tadalafil (CIALIS) 10 MG tablet,  Take 10 mg by mouth Daily As Needed for erectile dysfunction., Disp: , Rfl:     The following portions of the patient's history were reviewed and updated as appropriate: allergies, current medications, past family history, past medical history, past social history, past surgical history and problem list.    Review of Systems   Constitution: Negative for chills, decreased appetite, diaphoresis, fever, weakness, malaise/fatigue, night sweats, weight gain and weight loss.   HENT: Negative for congestion, headaches and nosebleeds.    Eyes: Negative for blurred vision, visual disturbance and visual halos.   Cardiovascular: Positive for irregular heartbeat. Negative for chest pain, claudication, cyanosis, dyspnea on exertion, leg swelling, near-syncope, orthopnea, palpitations, paroxysmal nocturnal dyspnea and syncope.   Respiratory: Negative for cough, hemoptysis, shortness of breath, sleep disturbances due to breathing, snoring, sputum production and wheezing.    Endocrine: Negative for cold intolerance, heat intolerance, polydipsia, polyphagia and polyuria.   Hematologic/Lymphatic: Does not bruise/bleed easily.   Skin: Negative for dry skin, itching and rash.   Musculoskeletal: Negative for falls, joint pain, joint swelling, muscle weakness and myalgias.   Gastrointestinal: Negative for bloating, abdominal pain, constipation, diarrhea, dysphagia, heartburn, melena, nausea and vomiting.   Genitourinary: Negative for dysuria, flank pain, hematuria and nocturia.   Neurological: Negative for difficulty with concentration, excessive daytime sleepiness, dizziness and loss of balance.   Psychiatric/Behavioral: Negative for altered mental status and depression. The patient is not nervous/anxious.    Allergic/Immunologic: Negative for environmental allergies.       Objective:     Vitals:    08/04/17 0917 08/04/17 0918 08/04/17 0919   BP: 147/92 141/88 133/95   BP Location: Right arm Left arm Left arm   Patient Position: Sitting  "Sitting Standing   Pulse: 71  87   Resp: 16     Temp: 97.9 °F (36.6 °C)     TempSrc: Temporal Artery      SpO2: 98%     Weight: 210 lb 3.2 oz (95.3 kg)     Height: 73\" (185.4 cm)           Physical Exam   Constitutional: He is oriented to person, place, and time. He appears well-developed and well-nourished. No distress.   HENT:   Head: Normocephalic and atraumatic.   Mouth/Throat: Oropharynx is clear and moist.   Eyes: Conjunctivae are normal. Pupils are equal, round, and reactive to light. No scleral icterus.   Neck: No hepatojugular reflux and no JVD present. Carotid bruit is not present. No tracheal deviation present. No thyromegaly present.   Cardiovascular: Normal rate, regular rhythm, normal heart sounds and intact distal pulses.  Exam reveals no friction rub.    No murmur heard.  Pulmonary/Chest: Effort normal and breath sounds normal.   Abdominal: Soft. Bowel sounds are normal. He exhibits no distension. There is no tenderness.   Musculoskeletal: He exhibits no edema.   Lymphadenopathy:     He has no cervical adenopathy.   Neurological: He is alert and oriented to person, place, and time.   Skin: Skin is warm, dry and intact. No rash noted. No cyanosis or erythema. No pallor.   Psychiatric: He has a normal mood and affect. His behavior is normal. Thought content normal.   Vitals reviewed.          Assessment and Plan:         1. Palpitations  Improved on BB    2. PAC (premature atrial contraction)    - metoprolol succinate XL (TOPROL-XL) 25 MG 24 hr tablet; Take 1 tablet by mouth Daily.  Dispense: 30 tablet; Refill: 4    3. Essential hypertension  Better controlled  F/u with PCP    4. NSVT (nonsustained ventricular tachycardia)  No ischemic changes noted on stress.  Small fixed defect noted  No CP or Ischemic equivalence noted.  Patient on aspirin, beta blocker.  To follow-up with primary care provider regarding lipid management    Discussed role of [facility] and when to call  At this time f/u as " needed.      *Please note that portions of this note were completed with a voice recognition program. Efforts were made to edit the dictations, but occasionally words are mistranscribed.

## 2017-11-06 DIAGNOSIS — I49.1 PAC (PREMATURE ATRIAL CONTRACTION): ICD-10-CM

## 2017-11-06 RX ORDER — METOPROLOL SUCCINATE 25 MG/1
TABLET, EXTENDED RELEASE ORAL
Qty: 60 TABLET | Refills: 3 | Status: SHIPPED | OUTPATIENT
Start: 2017-11-06

## 2017-11-06 NOTE — TELEPHONE ENCOUNTER
Patient was seen on 8/4/17 and a new prescription for metoprolol succinate 25mg daily was started and he was told that he could take an additional tablet if needed. He has been taking 1 & 1/2 tablet daily and it is working great. However, he is running out of tablets and won't have enough to last until he sees Dr. Galloway again. A new prescription was sent to [facility] in [place] for metoprolol succinate 25mg daily with an additional tablet if needed.    Maye Childs, TaylorD

## 2018-06-01 ENCOUNTER — APPOINTMENT (OUTPATIENT)
Dept: CT IMAGING | Facility: HOSPITAL | Age: 68
End: 2018-06-01

## 2018-06-01 ENCOUNTER — TRANSCRIBE ORDERS (OUTPATIENT)
Dept: ADMINISTRATIVE | Facility: HOSPITAL | Age: 68
End: 2018-06-01

## 2018-06-01 ENCOUNTER — HOSPITAL ENCOUNTER (EMERGENCY)
Facility: HOSPITAL | Age: 68
Discharge: HOME | End: 2018-06-01
Attending: EMERGENCY MEDICINE | Admitting: EMERGENCY MEDICINE

## 2018-06-01 ENCOUNTER — LAB REQUISITION (OUTPATIENT)
Dept: LAB | Facility: HOSPITAL | Age: 68
End: 2018-06-01

## 2018-06-01 ENCOUNTER — HOSPITAL ENCOUNTER (OUTPATIENT)
Dept: CT IMAGING | Facility: HOSPITAL | Age: 68
Discharge: HOME | End: 2018-06-01
Attending: FAMILY MEDICINE | Admitting: FAMILY MEDICINE

## 2018-06-01 VITALS
RESPIRATION RATE: 26 BRPM | OXYGEN SATURATION: 96 % | BODY MASS INDEX: 27.17 KG/M2 | TEMPERATURE: 97.9 F | SYSTOLIC BLOOD PRESSURE: 133 MMHG | HEART RATE: 89 BPM | HEIGHT: 73 IN | DIASTOLIC BLOOD PRESSURE: 79 MMHG | WEIGHT: 205 LBS

## 2018-06-01 DIAGNOSIS — R10.84 ABDOMINAL PAIN, GENERALIZED: Primary | ICD-10-CM

## 2018-06-01 DIAGNOSIS — N30.01 ACUTE CYSTITIS WITH HEMATURIA: ICD-10-CM

## 2018-06-01 DIAGNOSIS — Z00.00 ROUTINE GENERAL MEDICAL EXAMINATION AT A HEALTH CARE FACILITY: ICD-10-CM

## 2018-06-01 DIAGNOSIS — G44.85 PRIMARY STABBING HEADACHE: ICD-10-CM

## 2018-06-01 DIAGNOSIS — R10.31 ACUTE BILATERAL LOWER ABDOMINAL PAIN: Primary | ICD-10-CM

## 2018-06-01 DIAGNOSIS — K57.32 DIVERTICULITIS OF LARGE INTESTINE WITHOUT PERFORATION OR ABSCESS WITHOUT BLEEDING: ICD-10-CM

## 2018-06-01 DIAGNOSIS — R10.32 ACUTE BILATERAL LOWER ABDOMINAL PAIN: Primary | ICD-10-CM

## 2018-06-01 DIAGNOSIS — R10.84 ABDOMINAL PAIN, GENERALIZED: ICD-10-CM

## 2018-06-01 LAB
ALBUMIN SERPL-MCNC: 4.9 G/DL (ref 3.2–4.8)
ALBUMIN/GLOB SERPL: 1.3 G/DL (ref 1.5–2.5)
ALP SERPL-CCNC: 92 U/L (ref 25–100)
ALT SERPL W P-5'-P-CCNC: 27 U/L (ref 7–40)
AMYLASE SERPL-CCNC: 90 U/L (ref 30–118)
ANION GAP SERPL CALCULATED.3IONS-SCNC: 14 MMOL/L (ref 3–11)
AST SERPL-CCNC: 36 U/L (ref 0–33)
BACTERIA UR QL AUTO: ABNORMAL /HPF
BASOPHILS # BLD AUTO: 0.01 10*3/MM3 (ref 0–0.2)
BASOPHILS NFR BLD AUTO: 0.1 % (ref 0–1)
BILIRUB SERPL-MCNC: 0.8 MG/DL (ref 0.3–1.2)
BILIRUB UR QL STRIP: NEGATIVE
BUN BLD-MCNC: 33 MG/DL (ref 9–23)
BUN/CREAT SERPL: 25.4 (ref 7–25)
CALCIUM SPEC-SCNC: 10.4 MG/DL (ref 8.7–10.4)
CHLORIDE SERPL-SCNC: 106 MMOL/L (ref 99–109)
CLARITY UR: CLEAR
CO2 SERPL-SCNC: 21 MMOL/L (ref 20–31)
COLOR UR: ABNORMAL
CREAT BLD-MCNC: 1.3 MG/DL (ref 0.6–1.3)
D-LACTATE SERPL-SCNC: 1.3 MMOL/L (ref 0.5–2)
DEPRECATED RDW RBC AUTO: 44.2 FL (ref 37–54)
EOSINOPHIL # BLD AUTO: 0 10*3/MM3 (ref 0–0.3)
EOSINOPHIL NFR BLD AUTO: 0 % (ref 0–3)
ERYTHROCYTE [DISTWIDTH] IN BLOOD BY AUTOMATED COUNT: 13.4 % (ref 11.3–14.5)
GFR SERPL CREATININE-BSD FRML MDRD: 55 ML/MIN/1.73
GLOBULIN UR ELPH-MCNC: 3.9 GM/DL
GLUCOSE BLD-MCNC: 141 MG/DL (ref 70–100)
GLUCOSE UR STRIP-MCNC: NEGATIVE MG/DL
HCT VFR BLD AUTO: 43.4 % (ref 38.9–50.9)
HGB BLD-MCNC: 15.4 G/DL (ref 13.1–17.5)
HGB UR QL STRIP.AUTO: NEGATIVE
HOLD SPECIMEN: NORMAL
HOLD SPECIMEN: NORMAL
HYALINE CASTS UR QL AUTO: ABNORMAL /LPF
IMM GRANULOCYTES # BLD: 0.12 10*3/MM3 (ref 0–0.03)
IMM GRANULOCYTES NFR BLD: 0.6 % (ref 0–0.6)
KETONES UR QL STRIP: ABNORMAL
LEUKOCYTE ESTERASE UR QL STRIP.AUTO: ABNORMAL
LIPASE SERPL-CCNC: 33 U/L (ref 6–51)
LIPASE SERPL-CCNC: 36 U/L (ref 6–51)
LYMPHOCYTES # BLD AUTO: 1.19 10*3/MM3 (ref 0.6–4.8)
LYMPHOCYTES NFR BLD AUTO: 6.1 % (ref 24–44)
MCH RBC QN AUTO: 32.2 PG (ref 27–31)
MCHC RBC AUTO-ENTMCNC: 35.5 G/DL (ref 32–36)
MCV RBC AUTO: 90.8 FL (ref 80–99)
MONOCYTES # BLD AUTO: 0.5 10*3/MM3 (ref 0–1)
MONOCYTES NFR BLD AUTO: 2.6 % (ref 0–12)
NEUTROPHILS # BLD AUTO: 17.78 10*3/MM3 (ref 1.5–8.3)
NEUTROPHILS NFR BLD AUTO: 91.2 % (ref 41–71)
NITRITE UR QL STRIP: NEGATIVE
PH UR STRIP.AUTO: 6 [PH] (ref 5–8)
PLATELET # BLD AUTO: 315 10*3/MM3 (ref 150–450)
PMV BLD AUTO: 9.9 FL (ref 6–12)
POTASSIUM BLD-SCNC: 3.9 MMOL/L (ref 3.5–5.5)
PROCALCITONIN SERPL-MCNC: 0.07 NG/ML
PROT SERPL-MCNC: 8.8 G/DL (ref 5.7–8.2)
PROT UR QL STRIP: ABNORMAL
RBC # BLD AUTO: 4.78 10*6/MM3 (ref 4.2–5.76)
RBC # UR: ABNORMAL /HPF
REF LAB TEST METHOD: ABNORMAL
SODIUM BLD-SCNC: 141 MMOL/L (ref 132–146)
SP GR UR STRIP: 1.04 (ref 1–1.03)
SQUAMOUS #/AREA URNS HPF: ABNORMAL /HPF
UROBILINOGEN UR QL STRIP: ABNORMAL
WBC NRBC COR # BLD: 19.48 10*3/MM3 (ref 3.5–10.8)
WBC UR QL AUTO: ABNORMAL /HPF
WHOLE BLOOD HOLD SPECIMEN: NORMAL
WHOLE BLOOD HOLD SPECIMEN: NORMAL

## 2018-06-01 PROCEDURE — 25010000002 ONDANSETRON PER 1 MG: Performed by: EMERGENCY MEDICINE

## 2018-06-01 PROCEDURE — 99284 EMERGENCY DEPT VISIT MOD MDM: CPT

## 2018-06-01 PROCEDURE — 83605 ASSAY OF LACTIC ACID: CPT | Performed by: EMERGENCY MEDICINE

## 2018-06-01 PROCEDURE — 84145 PROCALCITONIN (PCT): CPT | Performed by: EMERGENCY MEDICINE

## 2018-06-01 PROCEDURE — 25010000002 PIPERACILLIN SOD-TAZOBACTAM PER 1 G: Performed by: EMERGENCY MEDICINE

## 2018-06-01 PROCEDURE — 0 DIATRIZOATE MEGLUMINE & SODIUM PER 1 ML: Performed by: EMERGENCY MEDICINE

## 2018-06-01 PROCEDURE — 70450 CT HEAD/BRAIN W/O DYE: CPT

## 2018-06-01 PROCEDURE — 74176 CT ABD & PELVIS W/O CONTRAST: CPT

## 2018-06-01 PROCEDURE — 25010000002 HYDROMORPHONE PER 4 MG: Performed by: EMERGENCY MEDICINE

## 2018-06-01 PROCEDURE — 81001 URINALYSIS AUTO W/SCOPE: CPT | Performed by: EMERGENCY MEDICINE

## 2018-06-01 PROCEDURE — 25010000002 IOPAMIDOL 61 % SOLUTION: Performed by: EMERGENCY MEDICINE

## 2018-06-01 PROCEDURE — 82150 ASSAY OF AMYLASE: CPT

## 2018-06-01 PROCEDURE — 80053 COMPREHEN METABOLIC PANEL: CPT | Performed by: EMERGENCY MEDICINE

## 2018-06-01 PROCEDURE — 96375 TX/PRO/DX INJ NEW DRUG ADDON: CPT

## 2018-06-01 PROCEDURE — 83690 ASSAY OF LIPASE: CPT | Performed by: EMERGENCY MEDICINE

## 2018-06-01 PROCEDURE — 96365 THER/PROPH/DIAG IV INF INIT: CPT

## 2018-06-01 PROCEDURE — 93005 ELECTROCARDIOGRAM TRACING: CPT | Performed by: EMERGENCY MEDICINE

## 2018-06-01 PROCEDURE — 36415 COLL VENOUS BLD VENIPUNCTURE: CPT

## 2018-06-01 PROCEDURE — 85025 COMPLETE CBC W/AUTO DIFF WBC: CPT | Performed by: EMERGENCY MEDICINE

## 2018-06-01 PROCEDURE — 83690 ASSAY OF LIPASE: CPT

## 2018-06-01 PROCEDURE — 96376 TX/PRO/DX INJ SAME DRUG ADON: CPT

## 2018-06-01 PROCEDURE — 74177 CT ABD & PELVIS W/CONTRAST: CPT

## 2018-06-01 RX ORDER — HYDROMORPHONE HYDROCHLORIDE 1 MG/ML
0.5 INJECTION, SOLUTION INTRAMUSCULAR; INTRAVENOUS; SUBCUTANEOUS ONCE
Status: COMPLETED | OUTPATIENT
Start: 2018-06-01 | End: 2018-06-01

## 2018-06-01 RX ORDER — METRONIDAZOLE 250 MG/1
250 TABLET ORAL 3 TIMES DAILY
Qty: 30 TABLET | Refills: 0 | Status: SHIPPED | OUTPATIENT
Start: 2018-06-01 | End: 2018-06-11

## 2018-06-01 RX ORDER — LEVOFLOXACIN 500 MG/1
500 TABLET, FILM COATED ORAL DAILY
Qty: 10 TABLET | Refills: 0 | Status: SHIPPED | OUTPATIENT
Start: 2018-06-01

## 2018-06-01 RX ORDER — HYDROCODONE BITARTRATE AND ACETAMINOPHEN 5; 325 MG/1; MG/1
1 TABLET ORAL EVERY 6 HOURS PRN
Qty: 8 TABLET | Refills: 0 | Status: SHIPPED | OUTPATIENT
Start: 2018-06-01

## 2018-06-01 RX ORDER — SODIUM CHLORIDE 9 MG/ML
125 INJECTION, SOLUTION INTRAVENOUS CONTINUOUS
Status: DISCONTINUED | OUTPATIENT
Start: 2018-06-01 | End: 2018-06-02 | Stop reason: HOSPADM

## 2018-06-01 RX ORDER — SODIUM CHLORIDE 0.9 % (FLUSH) 0.9 %
10 SYRINGE (ML) INJECTION AS NEEDED
Status: DISCONTINUED | OUTPATIENT
Start: 2018-06-01 | End: 2018-06-02 | Stop reason: HOSPADM

## 2018-06-01 RX ORDER — ONDANSETRON 2 MG/ML
4 INJECTION INTRAMUSCULAR; INTRAVENOUS ONCE
Status: COMPLETED | OUTPATIENT
Start: 2018-06-01 | End: 2018-06-01

## 2018-06-01 RX ADMIN — HYDROMORPHONE HYDROCHLORIDE 0.5 MG: 1 INJECTION, SOLUTION INTRAMUSCULAR; INTRAVENOUS; SUBCUTANEOUS at 18:43

## 2018-06-01 RX ADMIN — TAZOBACTAM SODIUM AND PIPERACILLIN SODIUM 4.5 G: 500; 4 INJECTION, SOLUTION INTRAVENOUS at 19:46

## 2018-06-01 RX ADMIN — IOPAMIDOL 75 ML: 612 INJECTION, SOLUTION INTRAVENOUS at 20:31

## 2018-06-01 RX ADMIN — ONDANSETRON 4 MG: 2 INJECTION, SOLUTION INTRAMUSCULAR; INTRAVENOUS at 18:43

## 2018-06-01 RX ADMIN — DIATRIZOATE MEGLUMINE AND DIATRIZOATE SODIUM 15 ML: 660; 100 LIQUID ORAL; RECTAL at 19:29

## 2018-06-01 RX ADMIN — HYDROMORPHONE HYDROCHLORIDE 0.5 MG: 1 INJECTION, SOLUTION INTRAMUSCULAR; INTRAVENOUS; SUBCUTANEOUS at 19:29

## 2018-06-01 RX ADMIN — SODIUM CHLORIDE 2000 ML: 9 INJECTION, SOLUTION INTRAVENOUS at 19:30

## 2018-06-01 NOTE — ED PROVIDER NOTES
Subjective   History of Present Illness    Review of Systems  Encounter Date:08/04/2017     Chad Ohara  PO  [facility] [address]  [phone]     1950     JOYCELYN Galloway MD     Chad Ohara is a 67 y.o. male.        Subjective:      Chief Complaint:  Follow-up (6 week f/u  palpitations, and HTN)        HPI      Palpitations, intermittent, worse at night, > 3 months, but now improved with BB.  Able to sleep at night no concerns.  BP log has improved.  -140/ 70-90.  NO CP, pressure, dizziness,  Dyspnea, syncope, edema.  Continued ACE/HCTZ.  Recent normal stress/echo.            Patient Active Problem List     Diagnosis   • PAC (premature atrial contraction) [I49.1]   • NSVT (nonsustained ventricular tachycardia) [I47.2]   • Hypertension [I10]   • Palpitations [R00.2]       Overview Note:       ·  24-hour Holter monitor placed March 2017: Ventricular ectopies less than 1%, supraventricular ectopies less than 1%.  Minimal heart rate 54 bpm, average heart rate 72 bpm  · EF 55%, RV cavity mildly dilated, mild TR, normal RVSP, negative for PFO  · Zio monitor (05/18/17-05/29/17);  average heart rate 78 bpm, normal heart rate 50 bpm, one 5 beat run VT, frequent PACs 7%, rare PVCs less than 1%         • Leucocytosis [D72.829]           Past Medical History:   Diagnosis Date   • Hypertension    • Palpitations        No Known Allergies    Past Surgical History:   Procedure Laterality Date   • POPLITEAL VENOUS ANEURYSM REPAIR Left 2012   • TONSILLECTOMY         Family History   Problem Relation Age of Onset   • Cancer Mother         breast CA   • Cancer Sister    • Stroke Father 62       Social History     Social History   • Marital status:      Social History Main Topics   • Smoking status: Former Smoker     Packs/day: 1.00     Years: 30.00     Types: Cigarettes     Quit date: 2002   • Smokeless tobacco: Never Used   • Alcohol use Yes      Comment: on occasion   • Drug use: Yes     Types:  Marijuana   • Sexual activity: Defer     Other Topics Concern   • Not on file     Social History Narrative    Patient does not consume caffeine.     Patient lives at home with his wife.                Objective   Physical Exam    Procedures           ED Course                  MDM      Final diagnoses:   None

## 2018-06-01 NOTE — ED PROVIDER NOTES
Subjective   Chad Ohara is a 68 y.o. male who presents to the ED with c/o abdominal pain and vomiting with onset yesterday morning. The patient expresses that his abdominal pain is localized within his mid to lower abdomen and consists of cramping like sensations. Mr. Ohara states that his symptoms of vomiting began yesterday morning approximately a half-hour after consuming his breakfast. He states that following this initial episode he attempted to rest which offered him significant relief. However, upon waking this morning around 0730 he began vomiting again and has been since roughly once an hour. Additionally, the patient notes development of a severe headache today following his first round of retching but states that it is generalized in nature.     The patient also complains of persistent nausea, anorexia secondary to vomiting, fevers, chills, and some recent constipation (past 3-4 days) but denies hematochezia, rhinorrhea, cough, abdominal bloating, or any other acute complaints at this time. Additionally, the patient denies suspicious food intake, recent medication additions/changes, as well as denial of PMHx of renal calculi, diverticulosis, or chronic constipation. The patient states that he has no abdominal PSHx but notes a discovery of polyps during a prior colonoscopy. Mr. Ohara notes that he has not yet taken any laxatives to relieve his constipation but has been able to pass a small amount of flatus.           History provided by:  Patient  Vomiting   The primary symptoms include fever, abdominal pain, nausea and vomiting. Primary symptoms do not include hematemesis or hematochezia. The illness began yesterday. The onset was sudden. The problem has been rapidly worsening.   The illness is also significant for chills, anorexia and constipation. The illness does not include bloating. Associated medical issues do not include diverticulitis. Associated medical issues comments: PMHx of polyps.        Review of Systems   Constitutional: Positive for appetite change (Decreased appetite today), chills and fever.   HENT: Negative for rhinorrhea.    Respiratory: Negative for cough.    Gastrointestinal: Positive for abdominal pain, anorexia, constipation, nausea and vomiting. Negative for abdominal distention, bloating, blood in stool, hematemesis and hematochezia.   Neurological: Positive for headaches.   All other systems reviewed and are negative.      Past Medical History:   Diagnosis Date   • Hypertension    • Palpitations        No Known Allergies    Past Surgical History:   Procedure Laterality Date   • POPLITEAL VENOUS ANEURYSM REPAIR Left 2012   • TONSILLECTOMY         Family History   Problem Relation Age of Onset   • Cancer Mother         breast CA   • Cancer Sister    • Stroke Father 62       Social History     Social History   • Marital status:      Social History Main Topics   • Smoking status: Former Smoker     Packs/day: 1.00     Years: 30.00     Types: Cigarettes     Quit date: 2002   • Smokeless tobacco: Never Used   • Alcohol use Yes      Comment: on occasion   • Drug use: Yes     Types: Marijuana   • Sexual activity: Defer     Other Topics Concern   • Not on file     Social History Narrative    Patient does not consume caffeine.     Patient lives at home with his wife.              Objective   Physical Exam   Constitutional: He is oriented to person, place, and time. He appears well-developed and well-nourished. He appears distressed.   Alert and oriented, appearing to be in a moderate to severe amount of pain.    HENT:   Head: Normocephalic and atraumatic.   Right Ear: External ear normal.   Left Ear: External ear normal.   Nose: Nose normal.   Mouth/Throat: Oropharynx is clear and moist.   Nasopharynx unremarkable.   Eyes: Conjunctivae and EOM are normal. Pupils are equal, round, and reactive to light. No scleral icterus.   Neck: Normal range of motion. Neck supple. No JVD present.  No thyromegaly present.   No masses, no bruits.   Cardiovascular: Normal rate, regular rhythm and normal heart sounds.  Exam reveals no gallop and no friction rub.    No murmur heard.  No heaves.   Pulmonary/Chest: Effort normal and breath sounds normal. No respiratory distress. He has no wheezes. He has no rales.   Abdominal: Soft. Bowel sounds are normal. He exhibits no mass. There is tenderness (Lower abdominal TTP). There is no rebound and no guarding.   No organomegaly. Flanks normal without mass or rash.   Musculoskeletal: Normal range of motion. He exhibits no edema or deformity.   Extremities normal with no edema or synovitis.   Lymphadenopathy:     He has no cervical adenopathy.   Neurological: He is alert and oriented to person, place, and time.   Normal motor strength and sensation to touch. No facial asymmetry, strong voice, tongue midline. Neurovascularly intact.   Skin: Skin is warm and dry. No rash noted.   Psychiatric: He has a normal mood and affect. His behavior is normal.   Nursing note and vitals reviewed.      Procedures         ED Course       Recent Results (from the past 24 hour(s))   Amylase    Collection Time: 06/01/18 12:23 PM   Result Value Ref Range    Amylase 90 30 - 118 U/L   Lipase    Collection Time: 06/01/18 12:23 PM   Result Value Ref Range    Lipase 36 6 - 51 U/L   Comprehensive Metabolic Panel    Collection Time: 06/01/18  6:11 PM   Result Value Ref Range    Glucose 141 (H) 70 - 100 mg/dL    BUN 33 (H) 9 - 23 mg/dL    Creatinine 1.30 0.60 - 1.30 mg/dL    Sodium 141 132 - 146 mmol/L    Potassium 3.9 3.5 - 5.5 mmol/L    Chloride 106 99 - 109 mmol/L    CO2 21.0 20.0 - 31.0 mmol/L    Calcium 10.4 8.7 - 10.4 mg/dL    Total Protein 8.8 (H) 5.7 - 8.2 g/dL    Albumin 4.90 (H) 3.20 - 4.80 g/dL    ALT (SGPT) 27 7 - 40 U/L    AST (SGOT) 36 (H) 0 - 33 U/L    Alkaline Phosphatase 92 25 - 100 U/L    Total Bilirubin 0.8 0.3 - 1.2 mg/dL    eGFR Non African Amer 55 (L) >60 mL/min/1.73    Globulin  3.9 gm/dL    A/G Ratio 1.3 (L) 1.5 - 2.5 g/dL    BUN/Creatinine Ratio 25.4 (H) 7.0 - 25.0    Anion Gap 14.0 (H) 3.0 - 11.0 mmol/L   Lipase    Collection Time: 06/01/18  6:11 PM   Result Value Ref Range    Lipase 33 6 - 51 U/L   Light Blue Top    Collection Time: 06/01/18  6:11 PM   Result Value Ref Range    Extra Tube hold for add-on    Green Top (Gel)    Collection Time: 06/01/18  6:11 PM   Result Value Ref Range    Extra Tube Hold for add-ons.    Lavender Top    Collection Time: 06/01/18  6:11 PM   Result Value Ref Range    Extra Tube hold for add-on    Gold Top - SST    Collection Time: 06/01/18  6:11 PM   Result Value Ref Range    Extra Tube Hold for add-ons.    CBC Auto Differential    Collection Time: 06/01/18  6:11 PM   Result Value Ref Range    WBC 19.48 (H) 3.50 - 10.80 10*3/mm3    RBC 4.78 4.20 - 5.76 10*6/mm3    Hemoglobin 15.4 13.1 - 17.5 g/dL    Hematocrit 43.4 38.9 - 50.9 %    MCV 90.8 80.0 - 99.0 fL    MCH 32.2 (H) 27.0 - 31.0 pg    MCHC 35.5 32.0 - 36.0 g/dL    RDW 13.4 11.3 - 14.5 %    RDW-SD 44.2 37.0 - 54.0 fl    MPV 9.9 6.0 - 12.0 fL    Platelets 315 150 - 450 10*3/mm3    Neutrophil % 91.2 (H) 41.0 - 71.0 %    Lymphocyte % 6.1 (L) 24.0 - 44.0 %    Monocyte % 2.6 0.0 - 12.0 %    Eosinophil % 0.0 0.0 - 3.0 %    Basophil % 0.1 0.0 - 1.0 %    Immature Grans % 0.6 0.0 - 0.6 %    Neutrophils, Absolute 17.78 (H) 1.50 - 8.30 10*3/mm3    Lymphocytes, Absolute 1.19 0.60 - 4.80 10*3/mm3    Monocytes, Absolute 0.50 0.00 - 1.00 10*3/mm3    Eosinophils, Absolute 0.00 0.00 - 0.30 10*3/mm3    Basophils, Absolute 0.01 0.00 - 0.20 10*3/mm3    Immature Grans, Absolute 0.12 (H) 0.00 - 0.03 10*3/mm3   Procalcitonin    Collection Time: 06/01/18  6:11 PM   Result Value Ref Range    Procalcitonin 0.07 <=0.25 ng/mL   Urinalysis With / Culture If Indicated - Urine, Clean Catch    Collection Time: 06/01/18  6:40 PM   Result Value Ref Range    Color, UA Dark Yellow (A) Yellow, Straw    Appearance, UA Clear Clear    pH, UA  6.0 5.0 - 8.0    Specific Gravity, UA 1.039 (H) 1.001 - 1.030    Glucose, UA Negative Negative    Ketones, UA >=160 mg/dL (4+) (A) Negative    Bilirubin, UA Negative Negative    Blood, UA Negative Negative    Protein, UA 30 mg/dL (1+) (A) Negative    Leuk Esterase, UA Trace (A) Negative    Nitrite, UA Negative Negative    Urobilinogen, UA 1.0 E.U./dL 0.2 - 1.0 E.U./dL   Urinalysis, Microscopic Only - Urine, Clean Catch    Collection Time: 06/01/18  6:40 PM   Result Value Ref Range    RBC, UA 7-12 (A) None Seen, 0-2 /HPF    WBC, UA 6-12 (A) None Seen, 0-2 /HPF    Bacteria, UA None Seen None Seen, Trace /HPF    Squamous Epithelial Cells, UA 0-2 None Seen, 0-2 /HPF    Hyaline Casts, UA 7-12 0 - 6 /LPF    Methodology Automated Microscopy    Lactic Acid, Plasma    Collection Time: 06/01/18  8:07 PM   Result Value Ref Range    Lactate 1.3 0.5 - 2.0 mmol/L     Note: In addition to lab results from this visit, the labs listed above may include labs taken at another facility or during a different encounter within the last 24 hours. Please correlate lab times with ED admission and discharge times for further clarification of the services performed during this visit.    CT Abdomen Pelvis With Contrast   Final Result     Likely apparent wall thickening of partially/nondistended urinary bladder,    unlikely to represent cystitis/UTI.  Recommend correlation with urinary    analysis.         Other nonemergent/incidental findings as described.          THIS DOCUMENT HAS BEEN ELECTRONICALLY SIGNED BY MARCIA WOLFE MD      CT Head Without Contrast   Final Result     No evidence of an acute intracranial hemorrhage, mass lesion or obvious acute    ischemic infarction.         THIS DOCUMENT HAS BEEN ELECTRONICALLY SIGNED BY MARCIA WOLFE MD        Vitals:    06/01/18 1859 06/01/18 1900 06/01/18 1930 06/01/18 2101   BP:  156/90 162/97 135/92   BP Location:       Patient Position:       Pulse:       Resp:       Temp:       TempSrc:        SpO2: 100% 99% 100% 100%   Weight:       Height:         Medications   sodium chloride 0.9 % flush 10 mL (not administered)   sodium chloride 0.9 % infusion (not administered)   HYDROmorphone (DILAUDID) injection 0.5 mg (0.5 mg Intravenous Given 6/1/18 1843)   ondansetron (ZOFRAN) injection 4 mg (4 mg Intravenous Given 6/1/18 1843)   sodium chloride 0.9 % bolus 2,000 mL (2,000 mL Intravenous New Bag 6/1/18 1930)   HYDROmorphone (DILAUDID) injection 0.5 mg (0.5 mg Intravenous Given 6/1/18 1929)   diatrizoate meglumine-sodium (GASTROGRAFIN) 66-10 % solution 15 mL (15 mL Oral Given 6/1/18 1929)   piperacillin-tazobactam (ZOSYN) 4.5 g in iso-osmotic dextrose 100 mL IVPB (premix) (0 g Intravenous Stopped 6/1/18 2111)   iopamidol (ISOVUE-300) 61 % injection 100 mL (75 mL Intravenous Given 6/1/18 2031)     ECG/EMG Results (last 24 hours)     Procedure Component Value Units Date/Time    ECG 12 Lead [[id number]] Collected:  06/01/18 1810     Updated:  06/01/18 1910    Narrative:       Test Reason : abd pain, vomiting, headache  Blood Pressure : **/** mmHG  Vent. Rate : 073 BPM     Atrial Rate : 073 BPM     P-R Int : 156 ms          QRS Dur : 090 ms      QT Int : 436 ms       P-R-T Axes : 064 007 009 degrees     QTc Int : 480 ms    Sinus rhythm with marked sinus arrhythmia  Prolonged QT  Abnormal ECG  When compared with ECG of 14-MAR-2010 10:09,  QT has lengthened  Confirmed by ANIL CLARK MD ([id number]) on 6/1/2018 7:10:22 PM    Referred By:  EDMD           Confirmed By:ANIL CLARK MD                      MDM  Number of Diagnoses or Management Options  Acute bilateral lower abdominal pain:   Acute cystitis with hematuria:   Diverticulitis of large intestine without perforation or abscess without bleeding:   Primary stabbing headache:   Diagnosis management comments:       I reviewed all available studies at the bedside with the patient and his family.  His head CT is reassuring.  I suspect his headache is from the repeated  episodes of vomiting he is had.    His CBC shows a leukocytosis.  His chemistries are fairly bland but his urine shows some white and red blood cells a lot of ketones consistent with his decreased by mouth intake.    Given his white blood cell count and worsening pain I repeated a CAT scan of his abdomen and pelvis with IV and oral contrast.  Radiology felt that he had cystitis and diverticulosis but on my review of the scan I really think he probably has some diverticulitis inflaming his bladder.    In ER he received IV fluids and IV antibiotics and 2 doses of pain medication and on recheck she was feeling 100% better.  I repalpated his abdomen he has no tenderness.    Really wanted admitting the hospital for observation and serial exams and continued IV fluids and he desperately wants to go home.  He had his wife promises he'll return to the ER if worse in any way.  This is not an optimal strategy but I think it's reasonable.  He is reliable.  They have Zofran already and I wrote him for a few pain pills to have on hand for pain unrelieved by Tylenol and I've written for Flagyl and Levaquin for him to start taking.    We'll put him on a clear liquid diet for 48 hours and he'll transition to a low fiber diet for 2 weeks then high fiber diet after that.  He will follow-up with Dr. Gunter on Monday.  He'll return to the ER if worsen anyway.    All are agreeable with       Amount and/or Complexity of Data Reviewed  Clinical lab tests: reviewed  Tests in the radiology section of CPT®: reviewed  Tests in the medicine section of CPT®: reviewed        Final diagnoses:   Acute bilateral lower abdominal pain   Diverticulitis of large intestine without perforation or abscess without bleeding   Acute cystitis with hematuria   Primary stabbing headache       Documentation assistance provided by gilbert Angel.  Information recorded by the gilbert was done at my direction and has been verified and validated by me.      Portillo WELLER Jeanne  06/01/18 1855       Portillo WELLER Jeanne  06/01/18 1909       Portillo WELLER Jeanne  06/01/18 1917       Portillo WELLER Jeanne  06/01/18 1927       Portillo WELLER Jeanne  06/01/18 2000       Cortes Moreno MD  06/01/18 9149

## 2018-06-02 NOTE — DISCHARGE INSTRUCTIONS
Clear liquid diet for 2 days then transitioned to a low fiber diet for 2 weeks then high fiber diet    return to the ER if worse in any way

## 2018-11-08 ENCOUNTER — AMBULATORY SURGICAL CENTER (OUTPATIENT)
Dept: URBAN - METROPOLITAN AREA SURGERY 10 | Facility: SURGERY | Age: 68
End: 2018-11-08

## 2018-11-08 ENCOUNTER — OFFICE (OUTPATIENT)
Dept: URBAN - METROPOLITAN AREA PATHOLOGY 4 | Facility: PATHOLOGY | Age: 68
End: 2018-11-08

## 2018-11-08 DIAGNOSIS — K64.0 FIRST DEGREE HEMORRHOIDS: ICD-10-CM

## 2018-11-08 DIAGNOSIS — D12.2 BENIGN NEOPLASM OF ASCENDING COLON: ICD-10-CM

## 2018-11-08 DIAGNOSIS — Z86.010 PERSONAL HISTORY OF COLONIC POLYPS: ICD-10-CM

## 2018-11-08 DIAGNOSIS — K57.90 DIVERTICULOSIS OF INTESTINE, PART UNSPECIFIED, WITHOUT PERFO: ICD-10-CM

## 2018-11-08 PROCEDURE — 45385 COLONOSCOPY W/LESION REMOVAL: CPT | Mod: PT | Performed by: INTERNAL MEDICINE

## 2018-11-08 PROCEDURE — 88305 TISSUE EXAM BY PATHOLOGIST: CPT | Performed by: INTERNAL MEDICINE

## 2021-12-27 ENCOUNTER — TELEPHONE (OUTPATIENT)
Dept: NEUROLOGY | Facility: OTHER | Age: 71
End: 2021-12-27

## 2023-07-26 ENCOUNTER — TRANSCRIBE ORDERS (OUTPATIENT)
Dept: ADMINISTRATIVE | Facility: HOSPITAL | Age: 73
End: 2023-07-26
Payer: MEDICARE

## 2023-07-26 DIAGNOSIS — I72.4 ANEURYSM OF ARTERY OF LOWER EXTREMITY: Primary | ICD-10-CM

## 2023-07-26 DIAGNOSIS — Z86.79 HISTORY OF ANEURYSM: ICD-10-CM

## 2023-07-26 DIAGNOSIS — M79.89 LEFT LEG SWELLING: ICD-10-CM

## 2023-07-26 DIAGNOSIS — M79.605 LEFT LEG PAIN: ICD-10-CM

## 2023-07-26 DIAGNOSIS — R22.42 SKIN LUMP OF LEG, LEFT: ICD-10-CM

## 2023-07-26 DIAGNOSIS — I72.9 ANEURYSM: ICD-10-CM

## 2023-07-26 DIAGNOSIS — Z86.79: ICD-10-CM

## 2023-07-27 ENCOUNTER — HOSPITAL ENCOUNTER (OUTPATIENT)
Dept: CARDIOLOGY | Facility: HOSPITAL | Age: 73
Discharge: HOME | End: 2023-07-27
Payer: MEDICARE

## 2023-07-27 VITALS — HEIGHT: 73 IN | WEIGHT: 205 LBS | BODY MASS INDEX: 27.17 KG/M2

## 2023-07-27 DIAGNOSIS — M79.605 LEFT LEG PAIN: ICD-10-CM

## 2023-07-27 DIAGNOSIS — I72.4 ANEURYSM OF ARTERY OF LOWER EXTREMITY: ICD-10-CM

## 2023-07-27 LAB
BH CV GRAFT 1 - DISTAL ANASTAMOSIS PSV-LEFT: 174 CM/S
BH CV GRAFT 1 - DISTAL GRAFT PSV-LEFT: 229 CM/S
BH CV GRAFT 1 - INFLOW ARTERY PSV- LEFT: 75 CM/S
BH CV GRAFT 1 - MID GRAFT PSV-LEFT: 237 CM/S
BH CV GRAFT 1 - OUTFLOW ARTERY PSV-LEFT: 153 CM/S
BH CV GRAFT 1 - PROX GRAFT PSV-LEFT: 104 CM/S
BH CV GRAFT 1 - PROXIMAL ANASTAMOSIS PSV-LEFT: 85 CM/S
BH CV GRAFT BRACHIAL PRESSURE LEFT: 136 MMHG
BH CV GRAFT BRACHIAL PRESSURE RIGHT: 131 MMHG
BH CV LEA LEFT ANT TIBIAL A DISTAL PSV: 71 CM/S
BH CV LEA LEFT CFA PROX PSV: 91.9 CM/S
BH CV LEA LEFT DFA PROX PSV: 56.5 CM/S
BH CV LEA LEFT DPA PRESSURE: 162 MMHG
BH CV LEA LEFT PERONEAL  MID PSV: 70 CM/S
BH CV LEA LEFT POPITEAL A  DISTAL PSV: 97 CM/S
BH CV LEA LEFT POPITEAL A  PROX PSV: 169 CM/S
BH CV LEA LEFT PTA DISTAL PSV: 82.3 CM/S
BH CV LEA LEFT PTA MID EDV: 17 CM/S
BH CV LEA LEFT PTA MID PSV: 70.8 CM/S
BH CV LEA LEFT PTA PROX EDV: 20.3 CM/S
BH CV LEA LEFT PTA PROX PSV: 96 CM/S
BH CV LEA LEFT SFA DISTAL PSV: 84 CM/S
BH CV LEA LEFT SFA MID PSV: 77.4 CM/S
BH CV LEA LEFT SFA PROX PSV: 96 CM/S
BH CV LEA LEFT TIBEOPERONEAL PSV: 63.7 CM/S
BH CV LEA RIGHT DPA PRESSURE: 160 MMHG
BH CV LOW VAS LEFT LESSER SAPH VESSEL: 1
BH CV LOW VAS LEFT VARICOSITY BK VESSEL: 1
BH CV LOWER ARTERIAL LEFT ABI RATIO: 1.19
BH CV LOWER ARTERIAL RIGHT ABI RATIO: 1.17
BH CV LOWER VASCULAR LEFT COMMON FEMORAL AUGMENT: NORMAL
BH CV LOWER VASCULAR LEFT COMMON FEMORAL COMPETENT: NORMAL
BH CV LOWER VASCULAR LEFT COMMON FEMORAL COMPRESS: NORMAL
BH CV LOWER VASCULAR LEFT COMMON FEMORAL PHASIC: NORMAL
BH CV LOWER VASCULAR LEFT COMMON FEMORAL SPONT: NORMAL
BH CV LOWER VASCULAR LEFT DISTAL FEMORAL COMPRESS: NORMAL
BH CV LOWER VASCULAR LEFT GASTRONEMIUS COMPRESS: NORMAL
BH CV LOWER VASCULAR LEFT GREATER SAPH AK COMPRESS: NORMAL
BH CV LOWER VASCULAR LEFT GREATER SAPH BK COMPRESS: NORMAL
BH CV LOWER VASCULAR LEFT LESSER SAPH COMPRESS: NORMAL
BH CV LOWER VASCULAR LEFT LESSER SAPH THROMBUS: NORMAL
BH CV LOWER VASCULAR LEFT MID FEMORAL AUGMENT: NORMAL
BH CV LOWER VASCULAR LEFT MID FEMORAL COMPETENT: NORMAL
BH CV LOWER VASCULAR LEFT MID FEMORAL COMPRESS: NORMAL
BH CV LOWER VASCULAR LEFT MID FEMORAL PHASIC: NORMAL
BH CV LOWER VASCULAR LEFT MID FEMORAL SPONT: NORMAL
BH CV LOWER VASCULAR LEFT PERONEAL COMPRESS: NORMAL
BH CV LOWER VASCULAR LEFT POPLITEAL AUGMENT: NORMAL
BH CV LOWER VASCULAR LEFT POPLITEAL COMPETENT: NORMAL
BH CV LOWER VASCULAR LEFT POPLITEAL COMPRESS: NORMAL
BH CV LOWER VASCULAR LEFT POPLITEAL PHASIC: NORMAL
BH CV LOWER VASCULAR LEFT POPLITEAL SPONT: NORMAL
BH CV LOWER VASCULAR LEFT POSTERIOR TIBIAL COMPRESS: NORMAL
BH CV LOWER VASCULAR LEFT PROXIMAL FEMORAL COMPRESS: NORMAL
BH CV LOWER VASCULAR LEFT SAPHENOFEMORAL JUNCTION COMPRESS: NORMAL
BH CV LOWER VASCULAR LEFT VARICOSITY BK COMPRESS: NORMAL
BH CV LOWER VASCULAR LEFT VARICOSITY BK THROMBUS: NORMAL
BH CV LOWER VASCULAR RIGHT COMMON FEMORAL AUGMENT: NORMAL
BH CV LOWER VASCULAR RIGHT COMMON FEMORAL COMPETENT: NORMAL
BH CV LOWER VASCULAR RIGHT COMMON FEMORAL PHASIC: NORMAL
BH CV LOWER VASCULAR RIGHT COMMON FEMORAL SPONT: NORMAL
BH CV VAS PRELIMINARY FINDINGS SCRIPTING: 1

## 2023-07-27 PROCEDURE — 93926 LOWER EXTREMITY STUDY: CPT | Performed by: INTERNAL MEDICINE

## 2023-07-27 PROCEDURE — 93971 EXTREMITY STUDY: CPT

## 2023-07-27 PROCEDURE — 93926 LOWER EXTREMITY STUDY: CPT

## 2023-12-11 ENCOUNTER — AMBULATORY SURGICAL CENTER (OUTPATIENT)
Dept: URBAN - METROPOLITAN AREA SURGERY 10 | Facility: SURGERY | Age: 73
End: 2023-12-11

## 2023-12-11 ENCOUNTER — OFFICE (OUTPATIENT)
Dept: URBAN - METROPOLITAN AREA PATHOLOGY 4 | Facility: PATHOLOGY | Age: 73
End: 2023-12-11

## 2023-12-11 DIAGNOSIS — K63.5 POLYP OF COLON: ICD-10-CM

## 2023-12-11 DIAGNOSIS — Z86.010 PERSONAL HISTORY OF COLONIC POLYPS: ICD-10-CM

## 2023-12-11 DIAGNOSIS — D12.5 BENIGN NEOPLASM OF SIGMOID COLON: ICD-10-CM

## 2023-12-11 DIAGNOSIS — D12.4 BENIGN NEOPLASM OF DESCENDING COLON: ICD-10-CM

## 2023-12-11 DIAGNOSIS — K64.8 OTHER HEMORRHOIDS: ICD-10-CM

## 2023-12-11 DIAGNOSIS — D12.8 BENIGN NEOPLASM OF RECTUM: ICD-10-CM

## 2023-12-11 DIAGNOSIS — K57.90 DIVERTICULOSIS OF INTESTINE, PART UNSPECIFIED, WITHOUT PERFO: ICD-10-CM

## 2023-12-11 DIAGNOSIS — Z09 ENCOUNTER FOR FOLLOW-UP EXAMINATION AFTER COMPLETED TREATMEN: ICD-10-CM

## 2023-12-11 PROCEDURE — 45385 COLONOSCOPY W/LESION REMOVAL: CPT | Mod: PT | Performed by: INTERNAL MEDICINE

## 2023-12-11 PROCEDURE — 88305 TISSUE EXAM BY PATHOLOGIST: CPT | Performed by: INTERNAL MEDICINE

## 2023-12-12 PROBLEM — Z86.010 PERSONAL HISTORY OF COLON POLYPS: Status: ACTIVE | Noted: 2023-12-12

## 2024-02-12 ENCOUNTER — CONSULT (OUTPATIENT)
Dept: ONCOLOGY | Facility: CLINIC | Age: 74
End: 2024-02-12
Payer: MEDICARE

## 2024-02-12 VITALS
OXYGEN SATURATION: 99 % | SYSTOLIC BLOOD PRESSURE: 124 MMHG | RESPIRATION RATE: 28 BRPM | WEIGHT: 199 LBS | BODY MASS INDEX: 26.95 KG/M2 | DIASTOLIC BLOOD PRESSURE: 71 MMHG | HEART RATE: 50 BPM | TEMPERATURE: 97.5 F | HEIGHT: 72 IN

## 2024-02-12 DIAGNOSIS — D72.828 OTHER ELEVATED WHITE BLOOD CELL (WBC) COUNT: Primary | ICD-10-CM

## 2024-02-12 PROCEDURE — 99203 OFFICE O/P NEW LOW 30 MIN: CPT | Performed by: INTERNAL MEDICINE

## 2024-02-12 PROCEDURE — 1126F AMNT PAIN NOTED NONE PRSNT: CPT | Performed by: INTERNAL MEDICINE

## 2024-02-12 PROCEDURE — 3078F DIAST BP <80 MM HG: CPT | Performed by: INTERNAL MEDICINE

## 2024-02-12 PROCEDURE — 3074F SYST BP LT 130 MM HG: CPT | Performed by: INTERNAL MEDICINE

## 2024-02-12 RX ORDER — POLYETHYLENE GLYCOL-3350 AND ELECTROLYTES 236; 6.74; 5.86; 2.97; 22.74 G/274.31G; G/274.31G; G/274.31G; G/274.31G; G/274.31G
POWDER, FOR SOLUTION ORAL
COMMUNITY
Start: 2023-11-17

## 2024-02-12 RX ORDER — ROSUVASTATIN CALCIUM 20 MG/1
TABLET, COATED ORAL
COMMUNITY

## 2024-02-12 RX ORDER — NICOTINE POLACRILEX 4 MG/1
GUM, CHEWING ORAL
COMMUNITY

## 2024-02-12 RX ORDER — LISINOPRIL AND HYDROCHLOROTHIAZIDE 25; 20 MG/1; MG/1
1 TABLET ORAL DAILY
COMMUNITY

## 2024-05-01 ENCOUNTER — HOSPITAL ENCOUNTER (EMERGENCY)
Facility: HOSPITAL | Age: 74
Discharge: HOME | End: 2024-05-02
Attending: EMERGENCY MEDICINE
Payer: MEDICARE

## 2024-05-01 ENCOUNTER — APPOINTMENT (OUTPATIENT)
Dept: CT IMAGING | Facility: HOSPITAL | Age: 74
End: 2024-05-01
Payer: MEDICARE

## 2024-05-01 DIAGNOSIS — R10.84 GENERALIZED ABDOMINAL PAIN: ICD-10-CM

## 2024-05-01 DIAGNOSIS — G44.209 ACUTE NON INTRACTABLE TENSION-TYPE HEADACHE: Primary | ICD-10-CM

## 2024-05-01 LAB
ALBUMIN SERPL-MCNC: 4.3 G/DL (ref 3.5–5.2)
ALBUMIN/GLOB SERPL: 1.2 G/DL
ALP SERPL-CCNC: 86 U/L (ref 39–117)
ALT SERPL W P-5'-P-CCNC: 19 U/L (ref 1–41)
ANION GAP SERPL CALCULATED.3IONS-SCNC: 13 MMOL/L (ref 5–15)
AST SERPL-CCNC: 24 U/L (ref 1–40)
BASOPHILS # BLD AUTO: 0.03 10*3/MM3 (ref 0–0.2)
BASOPHILS NFR BLD AUTO: 0.2 % (ref 0–1.5)
BILIRUB SERPL-MCNC: 0.5 MG/DL (ref 0–1.2)
BUN SERPL-MCNC: 27 MG/DL (ref 8–23)
BUN/CREAT SERPL: 21.3 (ref 7–25)
CALCIUM SPEC-SCNC: 9.8 MG/DL (ref 8.6–10.5)
CHLORIDE SERPL-SCNC: 101 MMOL/L (ref 98–107)
CO2 SERPL-SCNC: 24 MMOL/L (ref 22–29)
CREAT SERPL-MCNC: 1.27 MG/DL (ref 0.76–1.27)
D-LACTATE SERPL-SCNC: 2.6 MMOL/L (ref 0.5–2)
DEPRECATED RDW RBC AUTO: 46.2 FL (ref 37–54)
EGFRCR SERPLBLD CKD-EPI 2021: 58.9 ML/MIN/1.73
EOSINOPHIL # BLD AUTO: 0.01 10*3/MM3 (ref 0–0.4)
EOSINOPHIL NFR BLD AUTO: 0.1 % (ref 0.3–6.2)
ERYTHROCYTE [DISTWIDTH] IN BLOOD BY AUTOMATED COUNT: 13.2 % (ref 12.3–15.4)
GLOBULIN UR ELPH-MCNC: 3.6 GM/DL
GLUCOSE SERPL-MCNC: 148 MG/DL (ref 65–99)
HCT VFR BLD AUTO: 39.2 % (ref 37.5–51)
HGB BLD-MCNC: 13.3 G/DL (ref 13–17.7)
HOLD SPECIMEN: NORMAL
IMM GRANULOCYTES # BLD AUTO: 0.09 10*3/MM3 (ref 0–0.05)
IMM GRANULOCYTES NFR BLD AUTO: 0.6 % (ref 0–0.5)
LIPASE SERPL-CCNC: 36 U/L (ref 13–60)
LYMPHOCYTES # BLD AUTO: 1.78 10*3/MM3 (ref 0.7–3.1)
LYMPHOCYTES NFR BLD AUTO: 11.4 % (ref 19.6–45.3)
MCH RBC QN AUTO: 32.1 PG (ref 26.6–33)
MCHC RBC AUTO-ENTMCNC: 33.9 G/DL (ref 31.5–35.7)
MCV RBC AUTO: 94.7 FL (ref 79–97)
MONOCYTES # BLD AUTO: 0.52 10*3/MM3 (ref 0.1–0.9)
MONOCYTES NFR BLD AUTO: 3.3 % (ref 5–12)
NEUTROPHILS NFR BLD AUTO: 13.13 10*3/MM3 (ref 1.7–7)
NEUTROPHILS NFR BLD AUTO: 84.4 % (ref 42.7–76)
NRBC BLD AUTO-RTO: 0 /100 WBC (ref 0–0.2)
PLATELET # BLD AUTO: 266 10*3/MM3 (ref 140–450)
PMV BLD AUTO: 9.4 FL (ref 6–12)
POTASSIUM SERPL-SCNC: 4 MMOL/L (ref 3.5–5.2)
PROT SERPL-MCNC: 7.9 G/DL (ref 6–8.5)
RBC # BLD AUTO: 4.14 10*6/MM3 (ref 4.14–5.8)
SODIUM SERPL-SCNC: 138 MMOL/L (ref 136–145)
WBC NRBC COR # BLD AUTO: 15.56 10*3/MM3 (ref 3.4–10.8)
WHOLE BLOOD HOLD COAG: NORMAL
WHOLE BLOOD HOLD SPECIMEN: NORMAL

## 2024-05-01 PROCEDURE — 99285 EMERGENCY DEPT VISIT HI MDM: CPT

## 2024-05-01 PROCEDURE — 74177 CT ABD & PELVIS W/CONTRAST: CPT

## 2024-05-01 PROCEDURE — 83605 ASSAY OF LACTIC ACID: CPT | Performed by: EMERGENCY MEDICINE

## 2024-05-01 PROCEDURE — 85025 COMPLETE CBC W/AUTO DIFF WBC: CPT

## 2024-05-01 PROCEDURE — 80053 COMPREHEN METABOLIC PANEL: CPT

## 2024-05-01 PROCEDURE — 83690 ASSAY OF LIPASE: CPT

## 2024-05-01 PROCEDURE — 96375 TX/PRO/DX INJ NEW DRUG ADDON: CPT

## 2024-05-01 PROCEDURE — 25010000002 MORPHINE PER 10 MG: Performed by: EMERGENCY MEDICINE

## 2024-05-01 PROCEDURE — 25810000003 LACTATED RINGERS SOLUTION: Performed by: EMERGENCY MEDICINE

## 2024-05-01 PROCEDURE — 25010000002 KETOROLAC TROMETHAMINE PER 15 MG: Performed by: EMERGENCY MEDICINE

## 2024-05-01 PROCEDURE — 96374 THER/PROPH/DIAG INJ IV PUSH: CPT

## 2024-05-01 PROCEDURE — 25510000001 IOPAMIDOL 61 % SOLUTION: Performed by: EMERGENCY MEDICINE

## 2024-05-01 PROCEDURE — 25010000002 ONDANSETRON PER 1 MG: Performed by: EMERGENCY MEDICINE

## 2024-05-01 RX ORDER — METOCLOPRAMIDE HYDROCHLORIDE 5 MG/ML
10 INJECTION INTRAMUSCULAR; INTRAVENOUS ONCE
Status: COMPLETED | OUTPATIENT
Start: 2024-05-02 | End: 2024-05-02

## 2024-05-01 RX ORDER — ONDANSETRON 2 MG/ML
4 INJECTION INTRAMUSCULAR; INTRAVENOUS ONCE
Status: COMPLETED | OUTPATIENT
Start: 2024-05-01 | End: 2024-05-01

## 2024-05-01 RX ORDER — DIPHENHYDRAMINE HYDROCHLORIDE 50 MG/ML
25 INJECTION INTRAMUSCULAR; INTRAVENOUS ONCE
Status: COMPLETED | OUTPATIENT
Start: 2024-05-02 | End: 2024-05-02

## 2024-05-01 RX ORDER — SODIUM CHLORIDE 9 MG/ML
10 INJECTION, SOLUTION INTRAMUSCULAR; INTRAVENOUS; SUBCUTANEOUS AS NEEDED
Status: DISCONTINUED | OUTPATIENT
Start: 2024-05-01 | End: 2024-05-02 | Stop reason: HOSPADM

## 2024-05-01 RX ORDER — KETOROLAC TROMETHAMINE 15 MG/ML
15 INJECTION, SOLUTION INTRAMUSCULAR; INTRAVENOUS ONCE
Status: COMPLETED | OUTPATIENT
Start: 2024-05-01 | End: 2024-05-01

## 2024-05-01 RX ORDER — MORPHINE SULFATE 4 MG/ML
4 INJECTION, SOLUTION INTRAMUSCULAR; INTRAVENOUS ONCE
Status: COMPLETED | OUTPATIENT
Start: 2024-05-01 | End: 2024-05-01

## 2024-05-01 RX ADMIN — SODIUM CHLORIDE, POTASSIUM CHLORIDE, SODIUM LACTATE AND CALCIUM CHLORIDE 1000 ML: 600; 310; 30; 20 INJECTION, SOLUTION INTRAVENOUS at 22:22

## 2024-05-01 RX ADMIN — MORPHINE SULFATE 4 MG: 4 INJECTION, SOLUTION INTRAMUSCULAR; INTRAVENOUS at 22:21

## 2024-05-01 RX ADMIN — ONDANSETRON 4 MG: 2 INJECTION INTRAMUSCULAR; INTRAVENOUS at 22:20

## 2024-05-01 RX ADMIN — KETOROLAC TROMETHAMINE 15 MG: 15 INJECTION, SOLUTION INTRAMUSCULAR; INTRAVENOUS at 23:00

## 2024-05-01 RX ADMIN — IOPAMIDOL 80 ML: 612 INJECTION, SOLUTION INTRAVENOUS at 22:34

## 2024-05-02 ENCOUNTER — APPOINTMENT (OUTPATIENT)
Dept: CT IMAGING | Facility: HOSPITAL | Age: 74
End: 2024-05-02
Payer: MEDICARE

## 2024-05-02 VITALS
WEIGHT: 196 LBS | OXYGEN SATURATION: 100 % | TEMPERATURE: 97.8 F | HEART RATE: 75 BPM | RESPIRATION RATE: 16 BRPM | SYSTOLIC BLOOD PRESSURE: 167 MMHG | HEIGHT: 73 IN | BODY MASS INDEX: 25.98 KG/M2 | DIASTOLIC BLOOD PRESSURE: 90 MMHG

## 2024-05-02 LAB
BILIRUB UR QL STRIP: NEGATIVE
CLARITY UR: CLEAR
COLOR UR: YELLOW
D-LACTATE SERPL-SCNC: 1.9 MMOL/L (ref 0.5–2)
FLUAV RNA RESP QL NAA+PROBE: NOT DETECTED
FLUBV RNA RESP QL NAA+PROBE: NOT DETECTED
GLUCOSE UR STRIP-MCNC: NEGATIVE MG/DL
HGB UR QL STRIP.AUTO: NEGATIVE
KETONES UR QL STRIP: ABNORMAL
LEUKOCYTE ESTERASE UR QL STRIP.AUTO: NEGATIVE
NITRITE UR QL STRIP: NEGATIVE
PH UR STRIP.AUTO: 7 [PH] (ref 5–8)
PROT UR QL STRIP: ABNORMAL
SARS-COV-2 RNA RESP QL NAA+PROBE: NOT DETECTED
SP GR UR STRIP: 1.04 (ref 1–1.03)
UROBILINOGEN UR QL STRIP: ABNORMAL

## 2024-05-02 PROCEDURE — 83605 ASSAY OF LACTIC ACID: CPT | Performed by: EMERGENCY MEDICINE

## 2024-05-02 PROCEDURE — 25010000002 DIPHENHYDRAMINE PER 50 MG: Performed by: EMERGENCY MEDICINE

## 2024-05-02 PROCEDURE — 87636 SARSCOV2 & INF A&B AMP PRB: CPT | Performed by: EMERGENCY MEDICINE

## 2024-05-02 PROCEDURE — 25010000002 METOCLOPRAMIDE PER 10 MG: Performed by: EMERGENCY MEDICINE

## 2024-05-02 PROCEDURE — 70450 CT HEAD/BRAIN W/O DYE: CPT

## 2024-05-02 PROCEDURE — 36415 COLL VENOUS BLD VENIPUNCTURE: CPT

## 2024-05-02 PROCEDURE — 96375 TX/PRO/DX INJ NEW DRUG ADDON: CPT

## 2024-05-02 PROCEDURE — 81003 URINALYSIS AUTO W/O SCOPE: CPT | Performed by: EMERGENCY MEDICINE

## 2024-05-02 RX ADMIN — DIPHENHYDRAMINE HYDROCHLORIDE 25 MG: 50 INJECTION INTRAMUSCULAR; INTRAVENOUS at 00:11

## 2024-05-02 RX ADMIN — METOCLOPRAMIDE 10 MG: 5 INJECTION, SOLUTION INTRAMUSCULAR; INTRAVENOUS at 00:11

## 2024-05-02 NOTE — DISCHARGE INSTRUCTIONS
I recommend you take Tylenol 650 mg every 6 hours and ibuprofen 400 mg every 6 hours as needed for pain unless you have a contraindication to these medications  .  Plenty of fluids to stay well-hydrated.  Return to the ER as needed for new or worsening symptoms.  Recommend follow-up with your primary care doctor

## 2024-05-02 NOTE — ED PROVIDER NOTES
Subjective   History of Present Illness  Patient presents for evaluation of abdominal pain, nausea, vomiting, chills.  The symptoms have been present for 3 days, initially got a little bit better after the first day but now worsening and pain is severe.  Pain is located in the mid to upper abdomen.  Patient states it feels similar to a previous episode of diverticulitis he had a few years ago.  He is not having any diarrhea or blood in his stool    History provided by:  Patient      Review of Systems    Past Medical History:   Diagnosis Date    Hypertension     Palpitations        No Known Allergies    Past Surgical History:   Procedure Laterality Date    POPLITEAL VENOUS ANEURYSM REPAIR Left 2012    TONSILLECTOMY         Family History   Problem Relation Age of Onset    Cancer Mother         breast CA    Cancer Sister     Stroke Father 62       Social History     Socioeconomic History    Marital status:    Tobacco Use    Smoking status: Former     Current packs/day: 0.00     Average packs/day: 1 pack/day for 30.0 years (30.0 ttl pk-yrs)     Types: Cigarettes     Start date:      Quit date:      Years since quittin.3    Smokeless tobacco: Never   Vaping Use    Vaping status: Never Used   Substance and Sexual Activity    Alcohol use: Yes     Comment: on occasion    Drug use: Yes     Types: Marijuana    Sexual activity: Defer           Objective   Physical Exam  Constitutional:       General: He is not in acute distress.  HENT:      Head: Normocephalic and atraumatic.   Eyes:      Conjunctiva/sclera: Conjunctivae normal.      Pupils: Pupils are equal, round, and reactive to light.   Cardiovascular:      Rate and Rhythm: Normal rate and regular rhythm.      Pulses: Normal pulses.      Heart sounds: No murmur heard.     No gallop.   Pulmonary:      Effort: Pulmonary effort is normal. No respiratory distress.   Abdominal:      General: Abdomen is flat. There is no distension.      Tenderness: There is  abdominal tenderness.      Comments: Tender in the periumbilical area.  No guarding or rebound tenderness   Musculoskeletal:         General: No swelling or deformity. Normal range of motion.   Skin:     General: Skin is warm and dry.      Capillary Refill: Capillary refill takes less than 2 seconds.   Neurological:      General: No focal deficit present.      Mental Status: He is alert and oriented to person, place, and time.   Psychiatric:         Mood and Affect: Mood normal.         Behavior: Behavior normal.         Procedures           ED Course  ED Course as of 05/02/24 0300   Thu May 02, 2024   0036 CT scan of the brain and abdomen and pelvis independently interpreted by myself demonstrate no acute intracranial or intra-abdominal pathology [KB]   0258 Laboratory workup independently interpreted by myself demonstrates leukocytosis and lactic acidemia. [KB]      ED Course User Index  [KB] Reagan Posada MD                                             Medical Decision Making  Differential diagnosis includes gastritis or gastroenteritis, colitis, diverticulitis.  Pancreatitis cholecystitis or appendicitis are also considered.  Lab and imaging studies were conducted.  1 L IV fluid bolus, 4 mg IV Zofran and 4 mg IV morphine were administered.    Patient was reevaluated on multiple occasions in the ER.  He did require a second round of medications to improve his symptoms but on subsequent evaluation patient states that he feels much better.  He has minimal ongoing abdominal symptoms and his headache is resolved.  While a definitive pathology is not reached in the emergency room I believe he has been adequately evaluated for emergent pathology at this time.  He does not have any meningeal signs, neurologic deficits on examination or mental state changes to warrant evaluation for meningitis at this time.  He states he is able to achieve close follow-up with her primary doctor.  He was counseled on return precautions  to the ER and discharged in good condition    Problems Addressed:  Acute non intractable tension-type headache: complicated acute illness or injury  Generalized abdominal pain: complicated acute illness or injury    Amount and/or Complexity of Data Reviewed  External Data Reviewed: notes.     Details: 2/12/2024 reviewed oncology note where patient was evaluated for a chronic leukocytosis.  Labs: ordered. Decision-making details documented in ED Course.  Radiology: ordered and independent interpretation performed. Decision-making details documented in ED Course.    Risk  OTC drugs.  Prescription drug management.  Parenteral controlled substances.  Decision regarding hospitalization.        Final diagnoses:   Acute non intractable tension-type headache   Generalized abdominal pain       ED Disposition  ED Disposition       ED Disposition   Discharge    Condition   Stable    Comment   --             Recent Results (from the past 24 hour(s))   Comprehensive Metabolic Panel    Collection Time: 05/01/24  8:55 PM    Specimen: Blood   Result Value Ref Range    Glucose 148 (H) 65 - 99 mg/dL    BUN 27 (H) 8 - 23 mg/dL    Creatinine 1.27 0.76 - 1.27 mg/dL    Sodium 138 136 - 145 mmol/L    Potassium 4.0 3.5 - 5.2 mmol/L    Chloride 101 98 - 107 mmol/L    CO2 24.0 22.0 - 29.0 mmol/L    Calcium 9.8 8.6 - 10.5 mg/dL    Total Protein 7.9 6.0 - 8.5 g/dL    Albumin 4.3 3.5 - 5.2 g/dL    ALT (SGPT) 19 1 - 41 U/L    AST (SGOT) 24 1 - 40 U/L    Alkaline Phosphatase 86 39 - 117 U/L    Total Bilirubin 0.5 0.0 - 1.2 mg/dL    Globulin 3.6 gm/dL    A/G Ratio 1.2 g/dL    BUN/Creatinine Ratio 21.3 7.0 - 25.0    Anion Gap 13.0 5.0 - 15.0 mmol/L    eGFR 58.9 (L) >60.0 mL/min/1.73   Lipase    Collection Time: 05/01/24  8:55 PM    Specimen: Blood   Result Value Ref Range    Lipase 36 13 - 60 U/L   Lactic Acid, Plasma    Collection Time: 05/01/24  8:55 PM    Specimen: Blood   Result Value Ref Range    Lactate 2.6 (C) 0.5 - 2.0 mmol/L   Green Top  (Gel)    Collection Time: 05/01/24  8:55 PM   Result Value Ref Range    Extra Tube Hold for add-ons.    Lavender Top    Collection Time: 05/01/24  8:55 PM   Result Value Ref Range    Extra Tube hold for add-on    Gold Top - SST    Collection Time: 05/01/24  8:55 PM   Result Value Ref Range    Extra Tube Hold for add-ons.    Gray Top    Collection Time: 05/01/24  8:55 PM   Result Value Ref Range    Extra Tube Hold for add-ons.    Light Blue Top    Collection Time: 05/01/24  8:55 PM   Result Value Ref Range    Extra Tube Hold for add-ons.    CBC Auto Differential    Collection Time: 05/01/24  8:55 PM    Specimen: Blood   Result Value Ref Range    WBC 15.56 (H) 3.40 - 10.80 10*3/mm3    RBC 4.14 4.14 - 5.80 10*6/mm3    Hemoglobin 13.3 13.0 - 17.7 g/dL    Hematocrit 39.2 37.5 - 51.0 %    MCV 94.7 79.0 - 97.0 fL    MCH 32.1 26.6 - 33.0 pg    MCHC 33.9 31.5 - 35.7 g/dL    RDW 13.2 12.3 - 15.4 %    RDW-SD 46.2 37.0 - 54.0 fl    MPV 9.4 6.0 - 12.0 fL    Platelets 266 140 - 450 10*3/mm3    Neutrophil % 84.4 (H) 42.7 - 76.0 %    Lymphocyte % 11.4 (L) 19.6 - 45.3 %    Monocyte % 3.3 (L) 5.0 - 12.0 %    Eosinophil % 0.1 (L) 0.3 - 6.2 %    Basophil % 0.2 0.0 - 1.5 %    Immature Grans % 0.6 (H) 0.0 - 0.5 %    Neutrophils, Absolute 13.13 (H) 1.70 - 7.00 10*3/mm3    Lymphocytes, Absolute 1.78 0.70 - 3.10 10*3/mm3    Monocytes, Absolute 0.52 0.10 - 0.90 10*3/mm3    Eosinophils, Absolute 0.01 0.00 - 0.40 10*3/mm3    Basophils, Absolute 0.03 0.00 - 0.20 10*3/mm3    Immature Grans, Absolute 0.09 (H) 0.00 - 0.05 10*3/mm3    nRBC 0.0 0.0 - 0.2 /100 WBC   Urinalysis With Microscopic If Indicated (No Culture) - Urine, Clean Catch    Collection Time: 05/02/24 12:04 AM    Specimen: Urine, Clean Catch   Result Value Ref Range    Color, UA Yellow Yellow, Straw    Appearance, UA Clear Clear    pH, UA 7.0 5.0 - 8.0    Specific Gravity, UA 1.042 (H) 1.001 - 1.030    Glucose, UA Negative Negative    Ketones, UA 40 mg/dL (2+) (A) Negative     Bilirubin, UA Negative Negative    Blood, UA Negative Negative    Protein, UA Trace (A) Negative    Leuk Esterase, UA Negative Negative    Nitrite, UA Negative Negative    Urobilinogen, UA 0.2 E.U./dL 0.2 - 1.0 E.U./dL   COVID-19 and FLU A/B PCR, 1 HR TAT - Swab, Nasopharynx    Collection Time: 05/02/24 12:07 AM    Specimen: Nasopharynx; Swab   Result Value Ref Range    COVID19 Not Detected Not Detected - Ref. Range    Influenza A PCR Not Detected Not Detected    Influenza B PCR Not Detected Not Detected   STAT Lactic Acid, Reflex    Collection Time: 05/02/24 12:53 AM    Specimen: Arm, Right; Blood   Result Value Ref Range    Lactate 1.9 0.5 - 2.0 mmol/L     Note: In addition to lab results from this visit, the labs listed above may include labs taken at another facility or during a different encounter within the last 24 hours. Please correlate lab times with ED admission and discharge times for further clarification of the services performed during this visit.    CT Head Without Contrast   Final Result   Impression:   No acute intracranial process.            Electronically Signed: Francisco J Collins MD     5/2/2024 12:32 AM EDT     Workstation ID: [id number]      CT Abdomen Pelvis With Contrast   Final Result   No acute abdominopelvic pathology. Colonic diverticulosis without evidence of diverticulitis.               Electronically Signed: Delano Metzger MD     5/1/2024 10:42 PM EDT     Workstation ID: [id number]        Vitals:    05/01/24 2218 05/01/24 2300 05/01/24 2330 05/02/24 0000   BP: 155/90 170/88 159/91 167/90   BP Location:       Patient Position:       Pulse: 78 76 75 75   Resp:       Temp:       TempSrc:       SpO2: 97% 100% 98% 100%   Weight:       Height:         Medications   Sodium Chloride (PF) 0.9 % 10 mL (has no administration in time range)   lactated ringers bolus 1,000 mL (0 mL Intravenous Stopped 5/2/24 0117)   Morphine sulfate (PF) injection 4 mg (4 mg Intravenous Given 5/1/24 2221)   ondansetron  (ZOFRAN) injection 4 mg (4 mg Intravenous Given 5/1/24 2220)   iopamidol (ISOVUE-300) 61 % injection 100 mL (80 mL Intravenous Given 5/1/24 2234)   ketorolac (TORADOL) injection 15 mg (15 mg Intravenous Given 5/1/24 2300)   metoclopramide (REGLAN) injection 10 mg (10 mg Intravenous Given 5/2/24 0011)   diphenhydrAMINE (BENADRYL) injection 25 mg (25 mg Intravenous Given 5/2/24 0011)     ECG/EMG Results (last 24 hours)       ** No results found for the last 24 hours. **          No orders to display           Asha Joyce MD  [address] [address]  [phone]               Medication List      No changes were made to your prescriptions during this visit.            Reagan Posada MD  05/02/24 6125

## 2024-05-03 ENCOUNTER — APPOINTMENT (OUTPATIENT)
Dept: CT IMAGING | Facility: HOSPITAL | Age: 74
End: 2024-05-03
Payer: MEDICARE

## 2024-05-03 ENCOUNTER — HOSPITAL ENCOUNTER (EMERGENCY)
Facility: HOSPITAL | Age: 74
Discharge: HOME | End: 2024-05-03
Attending: STUDENT IN AN ORGANIZED HEALTH CARE EDUCATION/TRAINING PROGRAM
Payer: MEDICARE

## 2024-05-03 VITALS
BODY MASS INDEX: 26.51 KG/M2 | DIASTOLIC BLOOD PRESSURE: 68 MMHG | HEART RATE: 58 BPM | OXYGEN SATURATION: 96 % | WEIGHT: 200 LBS | SYSTOLIC BLOOD PRESSURE: 127 MMHG | TEMPERATURE: 98.7 F | HEIGHT: 73 IN | RESPIRATION RATE: 18 BRPM

## 2024-05-03 DIAGNOSIS — R51.9 ACUTE NONINTRACTABLE HEADACHE, UNSPECIFIED HEADACHE TYPE: Primary | ICD-10-CM

## 2024-05-03 DIAGNOSIS — R10.13 EPIGASTRIC PAIN: ICD-10-CM

## 2024-05-03 DIAGNOSIS — R11.2 NAUSEA AND VOMITING, UNSPECIFIED VOMITING TYPE: ICD-10-CM

## 2024-05-03 LAB
ALBUMIN SERPL-MCNC: 4.1 G/DL (ref 3.5–5.2)
ALBUMIN/GLOB SERPL: 1.2 G/DL
ALP SERPL-CCNC: 76 U/L (ref 39–117)
ALT SERPL W P-5'-P-CCNC: 19 U/L (ref 1–41)
ANION GAP SERPL CALCULATED.3IONS-SCNC: 17 MMOL/L (ref 5–15)
APPEARANCE CSF: CLEAR
AST SERPL-CCNC: 32 U/L (ref 1–40)
BASOPHILS # BLD AUTO: 0.06 10*3/MM3 (ref 0–0.2)
BASOPHILS NFR BLD AUTO: 0.4 % (ref 0–1.5)
BILIRUB SERPL-MCNC: 0.6 MG/DL (ref 0–1.2)
BILIRUB UR QL STRIP: NEGATIVE
BUN SERPL-MCNC: 33 MG/DL (ref 8–23)
BUN/CREAT SERPL: 21 (ref 7–25)
C GATTII+NEOFOR DNA CSF QL NAA+NON-PROBE: NOT DETECTED
CALCIUM SPEC-SCNC: 9.8 MG/DL (ref 8.6–10.5)
CHLORIDE SERPL-SCNC: 102 MMOL/L (ref 98–107)
CLARITY UR: CLEAR
CMV DNA CSF QL NAA+PROBE: NOT DETECTED
CO2 SERPL-SCNC: 22 MMOL/L (ref 22–29)
COLOR CSF: COLORLESS
COLOR SPUN CSF: COLORLESS
COLOR UR: YELLOW
CREAT SERPL-MCNC: 1.57 MG/DL (ref 0.76–1.27)
D-LACTATE SERPL-SCNC: 1.4 MMOL/L (ref 0.5–2)
D-LACTATE SERPL-SCNC: 3 MMOL/L (ref 0.5–2)
DEPRECATED RDW RBC AUTO: 45.9 FL (ref 37–54)
E COLI K1 DNA CSF QL NAA+NON-PROBE: NOT DETECTED
EGFRCR SERPLBLD CKD-EPI 2021: 45.7 ML/MIN/1.73
EOSINOPHIL # BLD AUTO: 0.01 10*3/MM3 (ref 0–0.4)
EOSINOPHIL NFR BLD AUTO: 0.1 % (ref 0.3–6.2)
ERYTHROCYTE [DISTWIDTH] IN BLOOD BY AUTOMATED COUNT: 13.2 % (ref 12.3–15.4)
EV RNA CSF QL NAA+PROBE: NOT DETECTED
FLUAV RNA RESP QL NAA+PROBE: NOT DETECTED
FLUBV RNA RESP QL NAA+PROBE: NOT DETECTED
GLOBULIN UR ELPH-MCNC: 3.3 GM/DL
GLUCOSE CSF-MCNC: 61 MG/DL (ref 40–70)
GLUCOSE SERPL-MCNC: 129 MG/DL (ref 65–99)
GLUCOSE UR STRIP-MCNC: NEGATIVE MG/DL
GP B STREP DNA SPEC QL NAA+PROBE: NOT DETECTED
HAEM INFLU SEROTYP DNA SPEC NAA+PROBE: NOT DETECTED
HCT VFR BLD AUTO: 37.4 % (ref 37.5–51)
HGB BLD-MCNC: 13 G/DL (ref 13–17.7)
HGB UR QL STRIP.AUTO: NEGATIVE
HHV6 DNA CSF QL NAA+PROBE: NOT DETECTED
HOLD SPECIMEN: NORMAL
HSV1 DNA CSF QL NAA+PROBE: NOT DETECTED
HSV2 DNA CSF QL NAA+PROBE: NOT DETECTED
IMM GRANULOCYTES # BLD AUTO: 0.15 10*3/MM3 (ref 0–0.05)
IMM GRANULOCYTES NFR BLD AUTO: 0.9 % (ref 0–0.5)
KETONES UR QL STRIP: ABNORMAL
L MONOCYTOG RRNA SPEC QL PROBE: NOT DETECTED
LEUKOCYTE ESTERASE UR QL STRIP.AUTO: NEGATIVE
LIPASE SERPL-CCNC: 25 U/L (ref 13–60)
LYMPHOCYTES # BLD AUTO: 1.45 10*3/MM3 (ref 0.7–3.1)
LYMPHOCYTES NFR BLD AUTO: 8.9 % (ref 19.6–45.3)
MAGNESIUM SERPL-MCNC: 1.8 MG/DL (ref 1.6–2.4)
MCH RBC QN AUTO: 32.7 PG (ref 26.6–33)
MCHC RBC AUTO-ENTMCNC: 34.8 G/DL (ref 31.5–35.7)
MCV RBC AUTO: 94 FL (ref 79–97)
MONOCYTES # BLD AUTO: 0.73 10*3/MM3 (ref 0.1–0.9)
MONOCYTES NFR BLD AUTO: 4.5 % (ref 5–12)
N MEN DNA SPEC QL NAA+PROBE: NOT DETECTED
NEUTROPHILS NFR BLD AUTO: 13.9 10*3/MM3 (ref 1.7–7)
NEUTROPHILS NFR BLD AUTO: 85.2 % (ref 42.7–76)
NITRITE UR QL STRIP: NEGATIVE
NRBC BLD AUTO-RTO: 0 /100 WBC (ref 0–0.2)
NT-PROBNP SERPL-MCNC: 321.7 PG/ML (ref 0–1800)
PARECHOVIRUS A RNA CSF QL NAA+NON-PROBE: NOT DETECTED
PH UR STRIP.AUTO: 7.5 [PH] (ref 5–8)
PHOSPHATE SERPL-MCNC: 0.8 MG/DL (ref 2.5–4.5)
PLATELET # BLD AUTO: 263 10*3/MM3 (ref 140–450)
PMV BLD AUTO: 9.4 FL (ref 6–12)
POTASSIUM SERPL-SCNC: 3.7 MMOL/L (ref 3.5–5.2)
PROCALCITONIN SERPL-MCNC: 0.05 NG/ML (ref 0–0.25)
PROT CSF-MCNC: 54.6 MG/DL (ref 15–45)
PROT SERPL-MCNC: 7.4 G/DL (ref 6–8.5)
PROT UR QL STRIP: ABNORMAL
RBC # BLD AUTO: 3.98 10*6/MM3 (ref 4.14–5.8)
RBC # CSF MANUAL: 10 /MM3 (ref 0–5)
RSV RNA RESP QL NAA+PROBE: NOT DETECTED
S PNEUM DNA CSF QL NAA+NON-PROBE: NOT DETECTED
SARS-COV-2 RNA RESP QL NAA+PROBE: NOT DETECTED
SODIUM SERPL-SCNC: 141 MMOL/L (ref 136–145)
SP GR UR STRIP: 1.02 (ref 1–1.03)
SPECIMEN VOL CSF: 4.5 ML
TROPONIN T SERPL HS-MCNC: 17 NG/L
TSH SERPL DL<=0.05 MIU/L-ACNC: 2.25 UIU/ML (ref 0.27–4.2)
TUBE # CSF: 4
UROBILINOGEN UR QL STRIP: ABNORMAL
VZV DNA CSF QL NAA+PROBE: NOT DETECTED
WBC # CSF MANUAL: 1 /MM3 (ref 0–5)
WBC NRBC COR # BLD AUTO: 16.3 10*3/MM3 (ref 3.4–10.8)
WHOLE BLOOD HOLD COAG: NORMAL
WHOLE BLOOD HOLD SPECIMEN: NORMAL

## 2024-05-03 PROCEDURE — 84443 ASSAY THYROID STIM HORMONE: CPT | Performed by: STUDENT IN AN ORGANIZED HEALTH CARE EDUCATION/TRAINING PROGRAM

## 2024-05-03 PROCEDURE — 87637 SARSCOV2&INF A&B&RSV AMP PRB: CPT | Performed by: STUDENT IN AN ORGANIZED HEALTH CARE EDUCATION/TRAINING PROGRAM

## 2024-05-03 PROCEDURE — 81003 URINALYSIS AUTO W/O SCOPE: CPT

## 2024-05-03 PROCEDURE — 96365 THER/PROPH/DIAG IV INF INIT: CPT

## 2024-05-03 PROCEDURE — 89050 BODY FLUID CELL COUNT: CPT | Performed by: STUDENT IN AN ORGANIZED HEALTH CARE EDUCATION/TRAINING PROGRAM

## 2024-05-03 PROCEDURE — 87529 HSV DNA AMP PROBE: CPT | Performed by: STUDENT IN AN ORGANIZED HEALTH CARE EDUCATION/TRAINING PROGRAM

## 2024-05-03 PROCEDURE — 83735 ASSAY OF MAGNESIUM: CPT | Performed by: STUDENT IN AN ORGANIZED HEALTH CARE EDUCATION/TRAINING PROGRAM

## 2024-05-03 PROCEDURE — 70450 CT HEAD/BRAIN W/O DYE: CPT

## 2024-05-03 PROCEDURE — 85025 COMPLETE CBC W/AUTO DIFF WBC: CPT

## 2024-05-03 PROCEDURE — 83880 ASSAY OF NATRIURETIC PEPTIDE: CPT | Performed by: STUDENT IN AN ORGANIZED HEALTH CARE EDUCATION/TRAINING PROGRAM

## 2024-05-03 PROCEDURE — 87070 CULTURE OTHR SPECIMN AEROBIC: CPT | Performed by: STUDENT IN AN ORGANIZED HEALTH CARE EDUCATION/TRAINING PROGRAM

## 2024-05-03 PROCEDURE — 87205 SMEAR GRAM STAIN: CPT | Performed by: STUDENT IN AN ORGANIZED HEALTH CARE EDUCATION/TRAINING PROGRAM

## 2024-05-03 PROCEDURE — 96361 HYDRATE IV INFUSION ADD-ON: CPT

## 2024-05-03 PROCEDURE — 99285 EMERGENCY DEPT VISIT HI MDM: CPT

## 2024-05-03 PROCEDURE — 84145 PROCALCITONIN (PCT): CPT | Performed by: STUDENT IN AN ORGANIZED HEALTH CARE EDUCATION/TRAINING PROGRAM

## 2024-05-03 PROCEDURE — 25010000002 DIPHENHYDRAMINE PER 50 MG: Performed by: STUDENT IN AN ORGANIZED HEALTH CARE EDUCATION/TRAINING PROGRAM

## 2024-05-03 PROCEDURE — 87040 BLOOD CULTURE FOR BACTERIA: CPT | Performed by: STUDENT IN AN ORGANIZED HEALTH CARE EDUCATION/TRAINING PROGRAM

## 2024-05-03 PROCEDURE — 25810000003 LACTATED RINGERS SOLUTION: Performed by: STUDENT IN AN ORGANIZED HEALTH CARE EDUCATION/TRAINING PROGRAM

## 2024-05-03 PROCEDURE — 74174 CTA ABD&PLVS W/CONTRAST: CPT

## 2024-05-03 PROCEDURE — 84157 ASSAY OF PROTEIN OTHER: CPT | Performed by: STUDENT IN AN ORGANIZED HEALTH CARE EDUCATION/TRAINING PROGRAM

## 2024-05-03 PROCEDURE — 82945 GLUCOSE OTHER FLUID: CPT | Performed by: STUDENT IN AN ORGANIZED HEALTH CARE EDUCATION/TRAINING PROGRAM

## 2024-05-03 PROCEDURE — 87498 ENTEROVIRUS PROBE&REVRS TRNS: CPT | Performed by: STUDENT IN AN ORGANIZED HEALTH CARE EDUCATION/TRAINING PROGRAM

## 2024-05-03 PROCEDURE — 96375 TX/PRO/DX INJ NEW DRUG ADDON: CPT

## 2024-05-03 PROCEDURE — 25010000002 HYDROMORPHONE PER 4 MG: Performed by: STUDENT IN AN ORGANIZED HEALTH CARE EDUCATION/TRAINING PROGRAM

## 2024-05-03 PROCEDURE — 70498 CT ANGIOGRAPHY NECK: CPT

## 2024-05-03 PROCEDURE — 84100 ASSAY OF PHOSPHORUS: CPT | Performed by: STUDENT IN AN ORGANIZED HEALTH CARE EDUCATION/TRAINING PROGRAM

## 2024-05-03 PROCEDURE — 93005 ELECTROCARDIOGRAM TRACING: CPT

## 2024-05-03 PROCEDURE — 36415 COLL VENOUS BLD VENIPUNCTURE: CPT

## 2024-05-03 PROCEDURE — 25510000001 IOPAMIDOL PER 1 ML: Performed by: STUDENT IN AN ORGANIZED HEALTH CARE EDUCATION/TRAINING PROGRAM

## 2024-05-03 PROCEDURE — 84484 ASSAY OF TROPONIN QUANT: CPT | Performed by: STUDENT IN AN ORGANIZED HEALTH CARE EDUCATION/TRAINING PROGRAM

## 2024-05-03 PROCEDURE — 93005 ELECTROCARDIOGRAM TRACING: CPT | Performed by: STUDENT IN AN ORGANIZED HEALTH CARE EDUCATION/TRAINING PROGRAM

## 2024-05-03 PROCEDURE — 25010000002 KETOROLAC TROMETHAMINE PER 15 MG: Performed by: STUDENT IN AN ORGANIZED HEALTH CARE EDUCATION/TRAINING PROGRAM

## 2024-05-03 PROCEDURE — 87015 SPECIMEN INFECT AGNT CONCNTJ: CPT | Performed by: STUDENT IN AN ORGANIZED HEALTH CARE EDUCATION/TRAINING PROGRAM

## 2024-05-03 PROCEDURE — 70496 CT ANGIOGRAPHY HEAD: CPT

## 2024-05-03 PROCEDURE — 83605 ASSAY OF LACTIC ACID: CPT | Performed by: STUDENT IN AN ORGANIZED HEALTH CARE EDUCATION/TRAINING PROGRAM

## 2024-05-03 PROCEDURE — 80053 COMPREHEN METABOLIC PANEL: CPT | Performed by: STUDENT IN AN ORGANIZED HEALTH CARE EDUCATION/TRAINING PROGRAM

## 2024-05-03 PROCEDURE — 25810000003 SODIUM CHLORIDE 0.9 % SOLUTION: Performed by: STUDENT IN AN ORGANIZED HEALTH CARE EDUCATION/TRAINING PROGRAM

## 2024-05-03 PROCEDURE — 25010000002 PROCHLORPERAZINE 10 MG/2ML SOLUTION: Performed by: STUDENT IN AN ORGANIZED HEALTH CARE EDUCATION/TRAINING PROGRAM

## 2024-05-03 PROCEDURE — 83690 ASSAY OF LIPASE: CPT | Performed by: STUDENT IN AN ORGANIZED HEALTH CARE EDUCATION/TRAINING PROGRAM

## 2024-05-03 PROCEDURE — 87483 CNS DNA AMP PROBE TYPE 12-25: CPT | Performed by: STUDENT IN AN ORGANIZED HEALTH CARE EDUCATION/TRAINING PROGRAM

## 2024-05-03 PROCEDURE — 25010000002 ONDANSETRON PER 1 MG: Performed by: STUDENT IN AN ORGANIZED HEALTH CARE EDUCATION/TRAINING PROGRAM

## 2024-05-03 RX ORDER — ONDANSETRON 4 MG/1
4 TABLET, ORALLY DISINTEGRATING ORAL 4 TIMES DAILY PRN
Qty: 12 TABLET | Refills: 0 | Status: SHIPPED | OUTPATIENT
Start: 2024-05-03

## 2024-05-03 RX ORDER — IBUPROFEN 600 MG/1
600 TABLET ORAL EVERY 6 HOURS PRN
Qty: 20 TABLET | Refills: 0 | Status: SHIPPED | OUTPATIENT
Start: 2024-05-03 | End: 2024-05-05 | Stop reason: HOSPADM

## 2024-05-03 RX ORDER — PROCHLORPERAZINE EDISYLATE 5 MG/ML
5 INJECTION INTRAMUSCULAR; INTRAVENOUS ONCE
Status: COMPLETED | OUTPATIENT
Start: 2024-05-03 | End: 2024-05-03

## 2024-05-03 RX ORDER — ONDANSETRON 2 MG/ML
4 INJECTION INTRAMUSCULAR; INTRAVENOUS ONCE
Status: COMPLETED | OUTPATIENT
Start: 2024-05-03 | End: 2024-05-03

## 2024-05-03 RX ORDER — FENTANYL/ROPIVACAINE/NS/PF 2-625MCG/1
15 PLASTIC BAG, INJECTION (ML) EPIDURAL
Status: DISCONTINUED | OUTPATIENT
Start: 2024-05-03 | End: 2024-05-03

## 2024-05-03 RX ORDER — SODIUM CHLORIDE 0.9 % (FLUSH) 0.9 %
10 SYRINGE (ML) INJECTION AS NEEDED
Status: DISCONTINUED | OUTPATIENT
Start: 2024-05-03 | End: 2024-05-04 | Stop reason: HOSPADM

## 2024-05-03 RX ORDER — ACETAMINOPHEN 500 MG
1000 TABLET ORAL ONCE
Status: COMPLETED | OUTPATIENT
Start: 2024-05-03 | End: 2024-05-03

## 2024-05-03 RX ORDER — KETOROLAC TROMETHAMINE 15 MG/ML
15 INJECTION, SOLUTION INTRAMUSCULAR; INTRAVENOUS ONCE
Status: COMPLETED | OUTPATIENT
Start: 2024-05-03 | End: 2024-05-03

## 2024-05-03 RX ORDER — DIPHENHYDRAMINE HYDROCHLORIDE 50 MG/ML
25 INJECTION INTRAMUSCULAR; INTRAVENOUS ONCE
Status: COMPLETED | OUTPATIENT
Start: 2024-05-03 | End: 2024-05-03

## 2024-05-03 RX ORDER — HYDROMORPHONE HYDROCHLORIDE 1 MG/ML
0.25 INJECTION, SOLUTION INTRAMUSCULAR; INTRAVENOUS; SUBCUTANEOUS ONCE
Status: COMPLETED | OUTPATIENT
Start: 2024-05-03 | End: 2024-05-03

## 2024-05-03 RX ADMIN — SODIUM CHLORIDE, POTASSIUM CHLORIDE, SODIUM LACTATE AND CALCIUM CHLORIDE 1000 ML: 600; 310; 30; 20 INJECTION, SOLUTION INTRAVENOUS at 17:47

## 2024-05-03 RX ADMIN — PROCHLORPERAZINE EDISYLATE 5 MG: 5 INJECTION INTRAMUSCULAR; INTRAVENOUS at 17:47

## 2024-05-03 RX ADMIN — KETOROLAC TROMETHAMINE 15 MG: 15 INJECTION, SOLUTION INTRAMUSCULAR; INTRAVENOUS at 15:55

## 2024-05-03 RX ADMIN — DIPHENHYDRAMINE HYDROCHLORIDE 25 MG: 50 INJECTION INTRAMUSCULAR; INTRAVENOUS at 17:47

## 2024-05-03 RX ADMIN — POTASSIUM PHOSPHATE, MONOBASIC POTASSIUM PHOSPHATE, DIBASIC 15 MMOL: 224; 236 INJECTION, SOLUTION, CONCENTRATE INTRAVENOUS at 19:02

## 2024-05-03 RX ADMIN — ACETAMINOPHEN 1000 MG: 500 TABLET ORAL at 17:47

## 2024-05-03 RX ADMIN — HYDROMORPHONE HYDROCHLORIDE 0.25 MG: 1 INJECTION, SOLUTION INTRAMUSCULAR; INTRAVENOUS; SUBCUTANEOUS at 17:55

## 2024-05-03 RX ADMIN — IOPAMIDOL 160 ML: 755 INJECTION, SOLUTION INTRAVENOUS at 16:42

## 2024-05-03 RX ADMIN — POTASSIUM & SODIUM PHOSPHATES POWDER PACK 280-160-250 MG 2 PACKET: 280-160-250 PACK at 20:13

## 2024-05-03 RX ADMIN — ONDANSETRON 4 MG: 2 INJECTION INTRAMUSCULAR; INTRAVENOUS at 15:55

## 2024-05-03 NOTE — ED PROVIDER NOTES
EMERGENCY DEPARTMENT ENCOUNTER    Pt Name: Chad Ohara  MRN: [id number]  Pt :   1950  Room Number:    Date of encounter:  5/3/2024  PCP: Asha Joyce MD  ED Provider: Daniel West MD    Historian: Patient      HPI:  Chief Complaint: Headache, epigastric pain, nausea and vomit        Context: Chad Ohara is a 74-year-old man presents emergency department for severe acute upper headache with nausea vomiting and epigastric pain was seen here in the ED 2 days ago symptoms got better but he has had a headache again since this morning.  He had associated epigastric pain which is new.  Denies fevers or neck stiffness.  No other complaints at this time.       PAST MEDICAL HISTORY  Past Medical History:   Diagnosis Date    Hypertension     Palpitations          PAST SURGICAL HISTORY  Past Surgical History:   Procedure Laterality Date    POPLITEAL VENOUS ANEURYSM REPAIR Left 2012    TONSILLECTOMY           FAMILY HISTORY  Family History   Problem Relation Age of Onset    Cancer Mother         breast CA    Cancer Sister     Stroke Father 62         SOCIAL HISTORY  Social History     Socioeconomic History    Marital status:    Tobacco Use    Smoking status: Former     Current packs/day: 0.00     Average packs/day: 1 pack/day for 30.0 years (30.0 ttl pk-yrs)     Types: Cigarettes     Start date:      Quit date:      Years since quittin.3    Smokeless tobacco: Never   Vaping Use    Vaping status: Never Used   Substance and Sexual Activity    Alcohol use: Yes     Comment: on occasion    Drug use: Yes     Types: Marijuana    Sexual activity: Defer         ALLERGIES  Patient has no known allergies.        REVIEW OF SYSTEMS  Review of Systems       All systems reviewed and negative except for those discussed in HPI.       PHYSICAL EXAM    I have reviewed the triage vital signs and nursing notes.    ED Triage Vitals [24 1509]   Temp Heart Rate Resp BP SpO2   98.7 °F (37.1 °C)  75 19 -- 99 %      Temp src Heart Rate Source Patient Position BP Location FiO2 (%)   Oral Monitor Sitting Left arm --       Physical Exam  GENERAL:   Appears ill, actively retching  HENT: Nares patent.  Able to touch chin to chest  EYES: No scleral icterus.  CV: Regular rhythm, regular rate.  RESPIRATORY: Normal effort.  No audible wheezes, rales or rhonchi.  ABDOMEN: Soft, nontender  MUSCULOSKELETAL: No deformities.   NEURO: Alert, moves all extremities, follows commands.  SKIN: Warm, dry, no rash visualized.      LAB RESULTS  Recent Results (from the past 24 hour(s))   ECG 12 Lead ED Triage Standing Order; Abdominal Pain (Upper)    Collection Time: 05/03/24  3:18 PM   Result Value Ref Range    QT Interval 426 ms    QTC Interval 469 ms   Comprehensive Metabolic Panel    Collection Time: 05/03/24  3:28 PM    Specimen: Blood   Result Value Ref Range    Glucose 129 (H) 65 - 99 mg/dL    BUN 33 (H) 8 - 23 mg/dL    Creatinine 1.57 (H) 0.76 - 1.27 mg/dL    Sodium 141 136 - 145 mmol/L    Potassium 3.7 3.5 - 5.2 mmol/L    Chloride 102 98 - 107 mmol/L    CO2 22.0 22.0 - 29.0 mmol/L    Calcium 9.8 8.6 - 10.5 mg/dL    Total Protein 7.4 6.0 - 8.5 g/dL    Albumin 4.1 3.5 - 5.2 g/dL    ALT (SGPT) 19 1 - 41 U/L    AST (SGOT) 32 1 - 40 U/L    Alkaline Phosphatase 76 39 - 117 U/L    Total Bilirubin 0.6 0.0 - 1.2 mg/dL    Globulin 3.3 gm/dL    A/G Ratio 1.2 g/dL    BUN/Creatinine Ratio 21.0 7.0 - 25.0    Anion Gap 17.0 (H) 5.0 - 15.0 mmol/L    eGFR 45.7 (L) >60.0 mL/min/1.73   Lipase    Collection Time: 05/03/24  3:28 PM    Specimen: Blood   Result Value Ref Range    Lipase 25 13 - 60 U/L   Single High Sensitivity Troponin T    Collection Time: 05/03/24  3:28 PM    Specimen: Blood   Result Value Ref Range    HS Troponin T 17 <22 ng/L   Green Top (Gel)    Collection Time: 05/03/24  3:28 PM   Result Value Ref Range    Extra Tube Hold for add-ons.    Lavender Top    Collection Time: 05/03/24  3:28 PM   Result Value Ref Range    Extra  Tube hold for add-on    Gold Top - SST    Collection Time: 05/03/24  3:28 PM   Result Value Ref Range    Extra Tube Hold for add-ons.    Gray Top    Collection Time: 05/03/24  3:28 PM   Result Value Ref Range    Extra Tube Hold for add-ons.    Light Blue Top    Collection Time: 05/03/24  3:28 PM   Result Value Ref Range    Extra Tube Hold for add-ons.    CBC Auto Differential    Collection Time: 05/03/24  3:28 PM    Specimen: Blood   Result Value Ref Range    WBC 16.30 (H) 3.40 - 10.80 10*3/mm3    RBC 3.98 (L) 4.14 - 5.80 10*6/mm3    Hemoglobin 13.0 13.0 - 17.7 g/dL    Hematocrit 37.4 (L) 37.5 - 51.0 %    MCV 94.0 79.0 - 97.0 fL    MCH 32.7 26.6 - 33.0 pg    MCHC 34.8 31.5 - 35.7 g/dL    RDW 13.2 12.3 - 15.4 %    RDW-SD 45.9 37.0 - 54.0 fl    MPV 9.4 6.0 - 12.0 fL    Platelets 263 140 - 450 10*3/mm3    Neutrophil % 85.2 (H) 42.7 - 76.0 %    Lymphocyte % 8.9 (L) 19.6 - 45.3 %    Monocyte % 4.5 (L) 5.0 - 12.0 %    Eosinophil % 0.1 (L) 0.3 - 6.2 %    Basophil % 0.4 0.0 - 1.5 %    Immature Grans % 0.9 (H) 0.0 - 0.5 %    Neutrophils, Absolute 13.90 (H) 1.70 - 7.00 10*3/mm3    Lymphocytes, Absolute 1.45 0.70 - 3.10 10*3/mm3    Monocytes, Absolute 0.73 0.10 - 0.90 10*3/mm3    Eosinophils, Absolute 0.01 0.00 - 0.40 10*3/mm3    Basophils, Absolute 0.06 0.00 - 0.20 10*3/mm3    Immature Grans, Absolute 0.15 (H) 0.00 - 0.05 10*3/mm3    nRBC 0.0 0.0 - 0.2 /100 WBC   BNP    Collection Time: 05/03/24  3:28 PM    Specimen: Blood   Result Value Ref Range    proBNP 321.7 0.0 - 1,800.0 pg/mL   Lactic Acid, Plasma    Collection Time: 05/03/24  3:28 PM    Specimen: Blood   Result Value Ref Range    Lactate 3.0 (C) 0.5 - 2.0 mmol/L   Procalcitonin    Collection Time: 05/03/24  3:28 PM    Specimen: Blood   Result Value Ref Range    Procalcitonin 0.05 0.00 - 0.25 ng/mL   Magnesium    Collection Time: 05/03/24  3:28 PM    Specimen: Blood   Result Value Ref Range    Magnesium 1.8 1.6 - 2.4 mg/dL   Phosphorus    Collection Time: 05/03/24   3:28 PM    Specimen: Blood   Result Value Ref Range    Phosphorus 0.8 (C) 2.5 - 4.5 mg/dL   TSH    Collection Time: 05/03/24  3:28 PM    Specimen: Blood   Result Value Ref Range    TSH 2.250 0.270 - 4.200 uIU/mL   Urinalysis With Microscopic If Indicated (No Culture) - Urine, Clean Catch    Collection Time: 05/03/24  3:30 PM    Specimen: Urine, Clean Catch   Result Value Ref Range    Color, UA Yellow Yellow, Straw    Appearance, UA Clear Clear    pH, UA 7.5 5.0 - 8.0    Specific Gravity, UA 1.020 1.001 - 1.030    Glucose, UA Negative Negative    Ketones, UA 40 mg/dL (2+) (A) Negative    Bilirubin, UA Negative Negative    Blood, UA Negative Negative    Protein, UA Trace (A) Negative    Leuk Esterase, UA Negative Negative    Nitrite, UA Negative Negative    Urobilinogen, UA 0.2 E.U./dL 0.2 - 1.0 E.U./dL   COVID-19, FLU A/B, RSV PCR 1 HR TAT - Swab, Nasopharynx    Collection Time: 05/03/24  3:57 PM    Specimen: Nasopharynx; Swab   Result Value Ref Range    COVID19 Not Detected Not Detected - Ref. Range    Influenza A PCR Not Detected Not Detected    Influenza B PCR Not Detected Not Detected    RSV, PCR Not Detected Not Detected   STAT Lactic Acid, Reflex    Collection Time: 05/03/24  7:19 PM    Specimen: Arm, Right; Blood   Result Value Ref Range    Lactate 1.4 0.5 - 2.0 mmol/L   Protein, CSF - Cerebrospinal Fluid, Lumbar Puncture    Collection Time: 05/03/24  7:52 PM    Specimen: Lumbar Puncture; Cerebrospinal Fluid   Result Value Ref Range    Protein, Total (CSF) 54.6 (H) 15.0 - 45.0 mg/dL   Glucose, CSF - Cerebrospinal Fluid, Lumbar Puncture    Collection Time: 05/03/24  7:52 PM    Specimen: Lumbar Puncture; Cerebrospinal Fluid   Result Value Ref Range    Glucose, CSF 61 40 - 70 mg/dL   Culture, CSF - Cerebrospinal Fluid, Lumbar Puncture    Collection Time: 05/03/24  7:52 PM    Specimen: Lumbar Puncture; Cerebrospinal Fluid   Result Value Ref Range    Gram Stain No WBCs or organisms seen    Cell Count, CSF -  Cerebrospinal Fluid, Lumbar Puncture    Collection Time: 05/03/24  7:52 PM    Specimen: Lumbar Puncture; Cerebrospinal Fluid   Result Value Ref Range    WBC, CSF 1 0 - 5 /mm3    RBC, CSF 10 (H) 0 - 5 /mm3    Color, CSF Colorless Colorless    Supernatant Color, CSF Colorless Colorless    Appearance, CSF Clear Clear    Volume, CSF 4.5 mL    Tube Number, CSF 4    Meningitis / Encephalitis Panel, PCR - Cerebrospinal Fluid, Lumbar Puncture    Collection Time: 05/03/24  7:52 PM    Specimen: Lumbar Puncture; Cerebrospinal Fluid   Result Value Ref Range    ESCHERICHIA COLI K1, PCR Not Detected Not Detected    HAEMOPHILUS INFLUENZAE, PCR Not Detected Not Detected    LISTERIA MONOCYTOGENES, PCR Not Detected Not Detected    NEISSERIA MENINGITIDIS, PCR Not Detected Not Detected    STREPTOCOCCUS AGALACTIAE, PCR Not Detected Not Detected    STREPTOCOCCUS PNEUMONIAE, PCR Not Detected Not Detected    CYTOMEGALOVIRUS (CMV), PCR Not Detected Not Detected    ENTEROVIRUS, PCR Not Detected Not Detected    HERPES SIMPLEX VIRUS 1 (HSV-1), PCR Not Detected Not Detected    HERPES SIMPLEX VIRUS 2 (HSV-2), PCR Not Detected Not Detected    HUMAN PARECHOVIRUS, PCR Not Detected Not Detected    VARICELLA ZOSTER VIRUS (VZV), PCR Not Detected Not Detected    CRYPTOCOCCUS NEOFORMANS / GATTII, PCR Not Detected Not Detected    HUMAN HERPES VIRUS 6 PCR Not Detected Not Detected       If labs were ordered, I independently reviewed the results and considered them in treating the patient.        RADIOLOGY  CT Angiogram Abdomen Pelvis    Result Date: 5/3/2024  CT ANGIOGRAM ABDOMEN PELVIS Date of Exam: 5/3/2024 4:30 PM EDT Indication: severe acute epigastric pain with nausea. Comparison: None available. Technique: CTA of the abdomen and pelvis was performed after the uneventful intravenous administration of 85 cc Isovue-370 IV contrast . Reconstructed coronal and sagittal images were also obtained. In addition, a 3-D volume rendered image was created for  interpretation. Automated exposure control and iterative reconstruction methods were used. FINDINGS: Lung bases: Small hiatal hernia. Liver:No masses. No intrahepatic biliary ductal dilatation. Spleen:No masses. No perisplenic hematoma. Pancreas:No pancreatic masses. No evidence of pancreatitis. Gallbladder and common bile duct:No evidence of cholelithiasis. No evidence of cholecystitis. Adrenal glands:No adrenal masses Kidneys and ureters: Tiny hypodensities involving both kidneys statistically likely to represent small renal cysts. No calculi present within the ureters. Normal caliber ureters. Urinary bladder:No urinary bladder wall thickening. No bladder masses. Small bowel:Normal caliber small bowel. Large bowel: Colonic diverticulosis without evidence of diverticulitis. Appendix: Normal GENITOURINARY: Normal prostate Ascites or pneumoperitoneum:None. Adenopathy:None present Osseous structures: Degenerative changes of the hips and lumbar spine. No lytic or blastic disease. Other findings: Arterial phase of the CT angiogram of the abdomen and pelvis shows mild multifocal atheromatous disease without vessel occlusion or stenosis. No evidence of dissection. No rupture or hemorrhage. Venous phase demonstrates normal renal excretion.     No vessel occlusion or stenosis. Small hiatal hernia. Colonic diverticulosis without evidence of diverticulitis. No aortic dissection or rupture. Electronically Signed: Delano Metzger MD  5/3/2024 5:10 PM EDT  Workstation ID: [id number]    CT Head Without Contrast    Result Date: 5/3/2024  CT HEAD WO CONTRAST, CT ANGIOGRAM NECK, CT ANGIOGRAM HEAD Date of Exam: 5/3/2024 4:30 PM EDT Indication: acute worst headache of life. Comparison: 6/1/2018 Technique: Axial CT images were obtained of the head without contrast administration.  Automated exposure control and iterative construction methods were used. CT angiogram of the head and neck obtained post administration of Isovue-370 IV contrast.  Coronal and sagittal reformats are reviewed. 3D and/or MIP reformats were rendered at an independent workstation. Findings: No intracranial hemorrhage. Gray-white matter differentiation is maintained without evidence of an acute infarction. Multiple foci of decreased attenuation are present within the subcortical, deep cerebral, and periventricular white matter consistent with chronic small vessel/microangiopathic ischemic changes. No extra-axial mass or collection. The ventricles and sulci are prominent commensurate with involutional changes. The posterior fossa appears grossly normal. Sellar and suprasellar structures are normal. Orbital and periorbital soft tissues are normal. The paranasal sinuses, ethmoid air cells, and mastoid air cells are aerated. The bony calvarium is intact. FINDINGS: The arch of the aorta is normal. The common carotid arteries are normal. Minimal atheromatous disease of the left carotid bulb. The extracranial internal carotid arteries are normal. Mild atheromatous disease of the intracranial segments of the  internal carotid arteries.. The carotid termini are normal. The visualized branches of the anterior and middle cerebral arteries appear normal. The vertebral arteries arise from the subclavian arteries. The right vertebral artery is dominant. No evidence of vertebral artery dissection. The vertebral arteries supply the basilar artery. The basilar artery tip is normal. The basilar artery terminates in bilateral posterior cerebral arteries which appear normal. The visualized lung apices are clear. The airway is clear. The thyroid gland is normal. No cervical adenopathy. The infratemporal fossa is normal bilaterally. No intracranial mass or mass effect. No midline shift. No extra-axial mass or collection. Orbital and paranasal soft tissues are normal. The paranasal sinuses are clear. The mastoid air cells are aerated. Degenerative changes of the spine.     Impression: No acute  intracranial pathology. Mild atheromatous disease without vessel occlusion or stenosis. No intracranial hemorrhage. Electronically Signed: Delano Metzger MD  5/3/2024 5:02 PM EDT  Workstation ID: [id number]    CT Angiogram Head    Result Date: 5/3/2024  CT HEAD WO CONTRAST, CT ANGIOGRAM NECK, CT ANGIOGRAM HEAD Date of Exam: 5/3/2024 4:30 PM EDT Indication: acute worst headache of life. Comparison: 6/1/2018 Technique: Axial CT images were obtained of the head without contrast administration.  Automated exposure control and iterative construction methods were used. CT angiogram of the head and neck obtained post administration of Isovue-370 IV contrast. Coronal and sagittal reformats are reviewed. 3D and/or MIP reformats were rendered at an independent workstation. Findings: No intracranial hemorrhage. Gray-white matter differentiation is maintained without evidence of an acute infarction. Multiple foci of decreased attenuation are present within the subcortical, deep cerebral, and periventricular white matter consistent with chronic small vessel/microangiopathic ischemic changes. No extra-axial mass or collection. The ventricles and sulci are prominent commensurate with involutional changes. The posterior fossa appears grossly normal. Sellar and suprasellar structures are normal. Orbital and periorbital soft tissues are normal. The paranasal sinuses, ethmoid air cells, and mastoid air cells are aerated. The bony calvarium is intact. FINDINGS: The arch of the aorta is normal. The common carotid arteries are normal. Minimal atheromatous disease of the left carotid bulb. The extracranial internal carotid arteries are normal. Mild atheromatous disease of the intracranial segments of the  internal carotid arteries.. The carotid termini are normal. The visualized branches of the anterior and middle cerebral arteries appear normal. The vertebral arteries arise from the subclavian arteries. The right vertebral artery is  dominant. No evidence of vertebral artery dissection. The vertebral arteries supply the basilar artery. The basilar artery tip is normal. The basilar artery terminates in bilateral posterior cerebral arteries which appear normal. The visualized lung apices are clear. The airway is clear. The thyroid gland is normal. No cervical adenopathy. The infratemporal fossa is normal bilaterally. No intracranial mass or mass effect. No midline shift. No extra-axial mass or collection. Orbital and paranasal soft tissues are normal. The paranasal sinuses are clear. The mastoid air cells are aerated. Degenerative changes of the spine.     Impression: No acute intracranial pathology. Mild atheromatous disease without vessel occlusion or stenosis. No intracranial hemorrhage. Electronically Signed: Delano Metzger MD  5/3/2024 5:02 PM EDT  Workstation ID: [id number]    CT Angiogram Neck    Result Date: 5/3/2024  CT HEAD WO CONTRAST, CT ANGIOGRAM NECK, CT ANGIOGRAM HEAD Date of Exam: 5/3/2024 4:30 PM EDT Indication: acute worst headache of life. Comparison: 6/1/2018 Technique: Axial CT images were obtained of the head without contrast administration.  Automated exposure control and iterative construction methods were used. CT angiogram of the head and neck obtained post administration of Isovue-370 IV contrast. Coronal and sagittal reformats are reviewed. 3D and/or MIP reformats were rendered at an independent workstation. Findings: No intracranial hemorrhage. Gray-white matter differentiation is maintained without evidence of an acute infarction. Multiple foci of decreased attenuation are present within the subcortical, deep cerebral, and periventricular white matter consistent with chronic small vessel/microangiopathic ischemic changes. No extra-axial mass or collection. The ventricles and sulci are prominent commensurate with involutional changes. The posterior fossa appears grossly normal. Sellar and suprasellar structures are normal.  Orbital and periorbital soft tissues are normal. The paranasal sinuses, ethmoid air cells, and mastoid air cells are aerated. The bony calvarium is intact. FINDINGS: The arch of the aorta is normal. The common carotid arteries are normal. Minimal atheromatous disease of the left carotid bulb. The extracranial internal carotid arteries are normal. Mild atheromatous disease of the intracranial segments of the  internal carotid arteries.. The carotid termini are normal. The visualized branches of the anterior and middle cerebral arteries appear normal. The vertebral arteries arise from the subclavian arteries. The right vertebral artery is dominant. No evidence of vertebral artery dissection. The vertebral arteries supply the basilar artery. The basilar artery tip is normal. The basilar artery terminates in bilateral posterior cerebral arteries which appear normal. The visualized lung apices are clear. The airway is clear. The thyroid gland is normal. No cervical adenopathy. The infratemporal fossa is normal bilaterally. No intracranial mass or mass effect. No midline shift. No extra-axial mass or collection. Orbital and paranasal soft tissues are normal. The paranasal sinuses are clear. The mastoid air cells are aerated. Degenerative changes of the spine.     Impression: No acute intracranial pathology. Mild atheromatous disease without vessel occlusion or stenosis. No intracranial hemorrhage. Electronically Signed: Delano Metzger MD  5/3/2024 5:02 PM EDT  Workstation ID: [id number]     I ordered and independently reviewed the above noted radiographic studies.      I viewed images of CT scan of the head which showed no acute bleeds masses or other abnormalities that I can appreciate.  Per my independent interpretation.  CTA of the head and neck did not show any aneurysms or other acute pathology.  CTA of the abdomen pelvis does not show any vascular injuries, ischemia, inflammation obstruction or other acute pathology  that I can appreciate.    See radiologist's dictation for official interpretation.        PROCEDURES    Lumbar Puncture    Date/Time: 5/4/2024 12:23 AM    Performed by: Daniel West MD  Authorized by: Daniel West MD    Consent:     Consent obtained:  Written    Consent given by:  Patient and spouse    Risks, benefits, and alternatives were discussed: yes      Risks discussed:  Bleeding, infection, pain, repeat procedure, nerve damage and headache    Alternatives discussed:  No treatment  Universal protocol:     Patient identity confirmed:  Verbally with patient  Pre-procedure details:     Procedure purpose:  Diagnostic    Preparation: Patient was prepped and draped in usual sterile fashion    Anesthesia:     Anesthesia method:  Local infiltration    Local anesthetic:  Lidocaine 1% w/o epi  Procedure details:     Lumbar space:  L3-L4 interspace    Patient position:  Sitting    Needle gauge:  20    Needle type:  Spinal needle - Quincke tip    Needle length (in):  3.5    Number of attempts:  2    Fluid appearance:  Blood-tinged then clearing    Tubes of fluid:  4    Total volume (ml):  5  Post-procedure details:     Puncture site:  Adhesive bandage applied    Procedure completion:  Tolerated well, no immediate complications      ECG 12 Lead ED Triage Standing Order; Abdominal Pain (Upper)   Preliminary Result   Test Reason : ED Triage Standing Order~   Blood Pressure :   */*   mmHG   Vent. Rate :  73 BPM     Atrial Rate :  73 BPM      P-R Int : 142 ms          QRS Dur :  82 ms       QT Int : 426 ms       P-R-T Axes : 103  10  26 degrees      QTc Int : 469 ms      Normal sinus rhythm   Junctional ST depression, probably normal   Borderline ECG   When compared with ECG of 01-JUN-2018 18:10,   No significant change was found      Referred By:            Confirmed By:           MEDICATIONS GIVEN IN ER    Medications   sodium chloride 0.9 % flush 10 mL (has no administration in time range)   Phosphorus  Replacement - Follow Nurse / BPA Driven Protocol (has no administration in time range)   ondansetron (ZOFRAN) injection 4 mg (4 mg Intravenous Given 5/3/24 1555)   lactated ringers bolus 1,000 mL (0 mL Intravenous Stopped 5/3/24 1904)   prochlorperazine (COMPAZINE) injection 5 mg (5 mg Intravenous Given 5/3/24 1747)   diphenhydrAMINE (BENADRYL) injection 25 mg (25 mg Intravenous Given 5/3/24 1747)   acetaminophen (TYLENOL) tablet 1,000 mg (1,000 mg Oral Given 5/3/24 1747)   ketorolac (TORADOL) injection 15 mg (15 mg Intravenous Given 5/3/24 1555)   iopamidol (ISOVUE-370) 76 % injection 160 mL (160 mL Intravenous Given 5/3/24 1642)   HYDROmorphone (DILAUDID) injection 0.25 mg (0.25 mg Intravenous Given 5/3/24 1755)   potassium & sodium phosphates (PHOS-NAK) 280-160-250 MG packet 2 packet (2 packets Oral Given 5/3/24 2013)         MEDICAL DECISION MAKING, PROGRESS, and CONSULTS    All labs, if obtained, have been independently reviewed by me.  All radiology studies, if obtained, have been reviewed by me and the radiologist dictating the report.  All EKG's, if obtained, have been independently viewed and interpreted by me/my attending physician.      Discussion below represents my analysis of pertinent findings related to patient's condition, differential diagnosis, treatment plan and final disposition.                         Differential diagnosis:    Meningitis, intracranial hemorrhage, migraine headache, sepsis, anemia, electrolyte abnormality, influenza, pancreatitis, diverticulitis, bowel obstruction, bowel perforation      Additional sources:    - Discussed/ obtained information from independent historians: Spouse    - External (non-ED) record review: Oncology note from February for persistent leukocytosis, cardiology notes for hypertension, PACs and nonsustained V. tach    - Chronic or social conditions impacting care: Hypertension, persistent leukocytosis    - Shared decision making: Patient requesting  discharge agreeable to strict return precautions.      Orders placed during this visit:  Orders Placed This Encounter   Procedures    COVID PRE-OP / PRE-PROCEDURE SCREENING ORDER (NO ISOLATION) - Swab, Nasopharynx    Blood Culture - Blood,    Blood Culture - Blood,    COVID-19, FLU A/B, RSV PCR 1 HR TAT - Swab, Nasopharynx    Culture, CSF - Cerebrospinal Fluid, Lumbar Puncture    Enterovirus RT-PCR - Cerebrospinal Fluid, Lumbar Puncture    Meningitis / Encephalitis Panel, PCR - Cerebrospinal Fluid, Lumbar Puncture    CT Angiogram Abdomen Pelvis    CT Head Without Contrast    CT Angiogram Head    CT Angiogram Neck    Peterson Draw    Comprehensive Metabolic Panel    Lipase    Single High Sensitivity Troponin T    Urinalysis With Microscopic If Indicated (No Culture) - Urine, Clean Catch    CBC Auto Differential    BNP    Lactic Acid, Plasma    Procalcitonin    Magnesium    Phosphorus    TSH    STAT Lactic Acid, Reflex    Phosphorus    Protein, CSF - Cerebrospinal Fluid, Lumbar Puncture    Glucose, CSF - Cerebrospinal Fluid, Lumbar Puncture    Herpes Simplex Virus (HSV) Types 1/2, Cerebrospinal Fluid (CSF), DNA PCR - Cerebrospinal Fluid, Lumbar Puncture    Cell Count, CSF - Cerebrospinal Fluid, Lumbar Puncture    NPO Diet NPO Type: Strict NPO    Undress & Gown    Continuous Pulse Oximetry    Vital Signs    Lumbar Puncture Tray to Bedside    Sterile Gloves to Bedside - SPECIFY SIZE    Verify Informed Consent    Oxygen Therapy- Nasal Cannula; Titrate 1-6 LPM Per SpO2; 90 - 95%    ECG 12 Lead ED Triage Standing Order; Abdominal Pain (Upper)    Insert Peripheral IV    CBC & Differential    Green Top (Gel)    Lavender Top    Gold Top - SST    Gray Top    Light Blue Top    Cell Count With Differential, CSF Use CSF Tube: 4         Additional orders considered but not ordered:      ED Course:    Consultants:      ED Course as of 05/04/24 0023   Fri May 03, 2024   1549 This is a 74-year-old man presents emergency department for  severe acute upper headache with nausea vomiting and epigastric pain was seen here in the ED 2 days ago symptoms got better but he has had a headache again since this morning.  He had associated epigastric pain which is new.  Denies fevers or neck stiffness.  No other complaints at this time. [CC]   1605 He arrived awake and alert but in obvious distress complaining of severe headache with associated nausea and vomiting denies fever or neck stiffness and appears not meningitic easily touches chin to chest also complaining of epigastric pain concern for things like pancreatitis diverticulitis etc. treating with IV fluids diphenhydramine acetaminophen Compazine obtaining CT scan of the head and abdomen as well as laboratory workup will reevaluate pending workup. [CC]   1902 CMP shows mildly worsening serum creatinine however when compared with prior values does not meet criteria for acute kidney injury and I assume is prerenal with a BUN of 33 and his retching and vomiting.  CBC shows leukocytosis which is confounded by his chronic leukocytosis.  Elevated lactate at 3.0 and mild hypophosphatemia have ordered repletion.  The rest of his labs generally reassuring and nonactionable.  CTA of the abdomen pelvis does not show any acute pathology and no ischemic lesions.  Head CT and CTA of the head and neck do not show any acute pathology.  Upon reevaluation after pain and nausea medicine he says he is feeling much better.  We discussed the workup and how I am concerned about him having the worst headache of his life multiple times in the last couple of days while he is not overtly meningitic we discussed lumbar puncture, this was discussed with the patient and his wife right now he says he is not interested in that I asked them to discuss it amongst themselves and explained my concern for it if his symptoms return he develops fevers or any thing concerning he needs to return to the ED right away. [CC]   Sat May 04, 2024    0022 After some discussion I was notified by nursing staff that he has decided to proceed with a lumbar puncture this was performed without difficulty.  Initial CSF shows a few reds though this is likely from traumatic tap.  Mildly elevated protein, normal glucose, no whites or bacteria on Gram stain and initial meningitis PCR is negative.  Upon reevaluation he remains well.  We discussed symptomatic care have given him prescription for Zofran counseled on importance of good oral hydration he will try Excedrin Migraine for recurrence of his headaches but I would like him to follow-up with neurology as well because of this acute onset headache syndrome at the age of 74.  Discharged in stable condition with strict return precautions. [CC]      ED Course User Index  [CC] Daniel West MD              Shared Decision Making:  After my consideration of clinical presentation and any laboratory/radiology studies obtained, I discussed the findings with the patient/patient representative who is in agreement with the treatment plan and the final disposition.   Risks and benefits of discharge and/or observation/admission were discussed.       AS OF 00:23 EDT VITALS:    BP - 127/68  HR - 58  TEMP - 98.7 °F (37.1 °C) (Oral)  O2 SATS - 96%                  DIAGNOSIS  Final diagnoses:   Acute nonintractable headache, unspecified headache type   Nausea and vomiting, unspecified vomiting type   Epigastric pain         DISPOSITION  DISCHARGE    Patient discharged in stable condition.    Reviewed implications of results, diagnosis, meds, responsibility to follow up, warning signs and symptoms of possible worsening, potential complications and reasons to return to ER.    Patient/Family voiced understanding of above instructions.    Discussed plan for discharge, as there is no emergent indication for admission.  Pt/family is agreeable and understands need for follow up and possible repeat testing.  Pt/family is aware that  discharge does not mean that nothing is wrong but that it indicates no emergency is currently present that requires admission and they must continue care with follow-up as given below or with a physician of their choice.     FOLLOW-UP  Asha Joyce MD  [address]  [address]  [facility] [id number] [phone]    Call       [facility] NEUROLOG[facility]  [address] [place] [id number] [phone]  Call   For neurology follow-up of recurrent headaches.         Medication List        New Prescriptions      ibuprofen 600 MG tablet  Commonly known as: ADVIL,MOTRIN  Take 1 tablet by mouth Every 6 (Six) Hours As Needed for Mild Pain.     ondansetron ODT 4 MG disintegrating tablet  Commonly known as: ZOFRAN-ODT  Place 1 tablet on the tongue 4 (Four) Times a Day As Needed for Nausea or Vomiting.               Where to Get Your Medications        These medications were sent to [facility] [id number] [place] [address] [facility] AT [address] [phone] [place] [id number] [address]      Phone: [phone]   ibuprofen 600 MG tablet  ondansetron ODT 4 MG disintegrating tablet             Please note that portions of this document were completed with voice recognition software.        Daniel West MD  05/04/24 0024

## 2024-05-04 ENCOUNTER — HOSPITAL ENCOUNTER (OUTPATIENT)
Facility: HOSPITAL | Age: 74
Setting detail: OBSERVATION
Discharge: HOME | End: 2024-05-05
Attending: STUDENT IN AN ORGANIZED HEALTH CARE EDUCATION/TRAINING PROGRAM | Admitting: STUDENT IN AN ORGANIZED HEALTH CARE EDUCATION/TRAINING PROGRAM
Payer: MEDICARE

## 2024-05-04 ENCOUNTER — APPOINTMENT (OUTPATIENT)
Dept: MRI IMAGING | Facility: HOSPITAL | Age: 74
End: 2024-05-04
Payer: MEDICARE

## 2024-05-04 ENCOUNTER — APPOINTMENT (OUTPATIENT)
Dept: ULTRASOUND IMAGING | Facility: HOSPITAL | Age: 74
End: 2024-05-04
Payer: MEDICARE

## 2024-05-04 ENCOUNTER — APPOINTMENT (OUTPATIENT)
Dept: CT IMAGING | Facility: HOSPITAL | Age: 74
End: 2024-05-04
Payer: MEDICARE

## 2024-05-04 DIAGNOSIS — F12.20 TETRAHYDROCANNABINOL (THC) DEPENDENCE: ICD-10-CM

## 2024-05-04 DIAGNOSIS — N17.9 ACUTE KIDNEY INJURY: ICD-10-CM

## 2024-05-04 DIAGNOSIS — R51.9 ACUTE NONINTRACTABLE HEADACHE, UNSPECIFIED HEADACHE TYPE: ICD-10-CM

## 2024-05-04 DIAGNOSIS — R11.2 INTRACTABLE NAUSEA AND VOMITING: Primary | ICD-10-CM

## 2024-05-04 DIAGNOSIS — E83.39 HYPOPHOSPHATEMIA: ICD-10-CM

## 2024-05-04 PROBLEM — I47.29 NSVT (NONSUSTAINED VENTRICULAR TACHYCARDIA): Status: RESOLVED | Noted: 2017-06-20 | Resolved: 2024-05-04

## 2024-05-04 PROBLEM — D72.829 LEUCOCYTOSIS: Status: RESOLVED | Noted: 2017-01-23 | Resolved: 2024-05-04

## 2024-05-04 PROBLEM — I49.1 PAC (PREMATURE ATRIAL CONTRACTION): Status: RESOLVED | Noted: 2017-06-20 | Resolved: 2024-05-04

## 2024-05-04 PROBLEM — R07.89 OTHER CHEST PAIN: Status: ACTIVE | Noted: 2024-05-04

## 2024-05-04 LAB
ALBUMIN SERPL-MCNC: 4.3 G/DL (ref 3.5–5.2)
ALBUMIN/GLOB SERPL: 1.3 G/DL
ALP SERPL-CCNC: 85 U/L (ref 39–117)
ALT SERPL W P-5'-P-CCNC: 23 U/L (ref 1–41)
AMPHET+METHAMPHET UR QL: NEGATIVE
AMPHETAMINES UR QL: NEGATIVE
ANION GAP SERPL CALCULATED.3IONS-SCNC: 14 MMOL/L (ref 5–15)
APAP SERPL-MCNC: 6.5 MCG/ML (ref 0–30)
ARTERIAL PATENCY WRIST A: ABNORMAL
AST SERPL-CCNC: 39 U/L (ref 1–40)
ATMOSPHERIC PRESS: ABNORMAL MM[HG]
BARBITURATES UR QL SCN: NEGATIVE
BASE EXCESS BLDA CALC-SCNC: 2.8 MMOL/L (ref 0–2)
BASOPHILS # BLD AUTO: 0.05 10*3/MM3 (ref 0–0.2)
BASOPHILS NFR BLD AUTO: 0.3 % (ref 0–1.5)
BDY SITE: ABNORMAL
BENZODIAZ UR QL SCN: NEGATIVE
BILIRUB SERPL-MCNC: 0.6 MG/DL (ref 0–1.2)
BILIRUB UR QL STRIP: NEGATIVE
BODY TEMPERATURE: 37
BUN SERPL-MCNC: 30 MG/DL (ref 8–23)
BUN/CREAT SERPL: 17.2 (ref 7–25)
BUPRENORPHINE SERPL-MCNC: NEGATIVE NG/ML
CALCIUM SPEC-SCNC: 10.1 MG/DL (ref 8.6–10.5)
CANNABINOIDS SERPL QL: POSITIVE
CHLORIDE SERPL-SCNC: 99 MMOL/L (ref 98–107)
CK SERPL-CCNC: 289 U/L (ref 20–200)
CLARITY UR: CLEAR
CO2 BLDA-SCNC: 22 MMOL/L (ref 22–33)
CO2 SERPL-SCNC: 25 MMOL/L (ref 22–29)
COCAINE UR QL: NEGATIVE
COHGB MFR BLD: 0.7 % (ref 0–2)
COLOR UR: YELLOW
CREAT BLDA-MCNC: 1.7 MG/DL (ref 0.6–1.3)
CREAT BLDA-MCNC: 1.7 MG/DL (ref 0.6–1.3)
CREAT SERPL-MCNC: 1.74 MG/DL (ref 0.76–1.27)
CRP SERPL-MCNC: <0.3 MG/DL (ref 0–0.5)
D DIMER PPP FEU-MCNC: 0.73 MCGFEU/ML (ref 0–0.75)
D-LACTATE SERPL-SCNC: 1.6 MMOL/L (ref 0.5–2)
D-LACTATE SERPL-SCNC: 2.7 MMOL/L (ref 0.5–2)
DEPRECATED RDW RBC AUTO: 46.2 FL (ref 37–54)
EGFRCR SERPLBLD CKD-EPI 2021: 40.4 ML/MIN/1.73
EOSINOPHIL # BLD AUTO: 0.02 10*3/MM3 (ref 0–0.4)
EOSINOPHIL NFR BLD AUTO: 0.1 % (ref 0.3–6.2)
EPAP: 0
ERYTHROCYTE [DISTWIDTH] IN BLOOD BY AUTOMATED COUNT: 13.2 % (ref 12.3–15.4)
ETHANOL BLD-MCNC: <10 MG/DL (ref 0–10)
FENTANYL UR-MCNC: NEGATIVE NG/ML
FLUAV RNA RESP QL NAA+PROBE: NOT DETECTED
FLUBV RNA RESP QL NAA+PROBE: NOT DETECTED
GEN 5 2HR TROPONIN T REFLEX: 20 NG/L
GLOBULIN UR ELPH-MCNC: 3.4 GM/DL
GLUCOSE BLDC GLUCOMTR-MCNC: 119 MG/DL (ref 70–130)
GLUCOSE SERPL-MCNC: 111 MG/DL (ref 65–99)
GLUCOSE UR STRIP-MCNC: NEGATIVE MG/DL
HCO3 BLDA-SCNC: 21.4 MMOL/L (ref 20–26)
HCT VFR BLD AUTO: 39.8 % (ref 37.5–51)
HCT VFR BLD CALC: 38.7 % (ref 38–51)
HETEROPH AB SER QL LA: NEGATIVE
HGB BLD-MCNC: 13.6 G/DL (ref 13–17.7)
HGB BLDA-MCNC: 12.6 G/DL (ref 13.5–17.5)
HGB UR QL STRIP.AUTO: NEGATIVE
IMM GRANULOCYTES # BLD AUTO: 0.12 10*3/MM3 (ref 0–0.05)
IMM GRANULOCYTES NFR BLD AUTO: 0.8 % (ref 0–0.5)
INHALED O2 CONCENTRATION: 21 %
IPAP: 0
KETONES UR QL STRIP: ABNORMAL
LEUKOCYTE ESTERASE UR QL STRIP.AUTO: NEGATIVE
LIPASE SERPL-CCNC: 30 U/L (ref 13–60)
LYMPHOCYTES # BLD AUTO: 2.13 10*3/MM3 (ref 0.7–3.1)
LYMPHOCYTES NFR BLD AUTO: 13.3 % (ref 19.6–45.3)
Lab: ABNORMAL
MAGNESIUM SERPL-MCNC: 1.9 MG/DL (ref 1.6–2.4)
MCH RBC QN AUTO: 32.3 PG (ref 26.6–33)
MCHC RBC AUTO-ENTMCNC: 34.2 G/DL (ref 31.5–35.7)
MCV RBC AUTO: 94.5 FL (ref 79–97)
METHADONE UR QL SCN: NEGATIVE
METHGB BLD QL: 0.1 % (ref 0–1.5)
MODALITY: ABNORMAL
MONOCYTES # BLD AUTO: 0.87 10*3/MM3 (ref 0.1–0.9)
MONOCYTES NFR BLD AUTO: 5.4 % (ref 5–12)
NEUTROPHILS NFR BLD AUTO: 12.78 10*3/MM3 (ref 1.7–7)
NEUTROPHILS NFR BLD AUTO: 80.1 % (ref 42.7–76)
NITRITE UR QL STRIP: NEGATIVE
NOTIFIED BY: ABNORMAL
NOTIFIED WHO: ABNORMAL
NRBC BLD AUTO-RTO: 0 /100 WBC (ref 0–0.2)
NT-PROBNP SERPL-MCNC: 509.1 PG/ML (ref 0–1800)
OPIATES UR QL: NEGATIVE
OXYCODONE UR QL SCN: NEGATIVE
OXYHGB MFR BLDV: 97.7 % (ref 94–99)
PAW @ PEAK INSP FLOW SETTING VENT: 0 CMH2O
PCO2 BLDA: 18.8 MM HG (ref 35–45)
PCO2 TEMP ADJ BLD: 18.8 MM HG (ref 35–48)
PCP UR QL SCN: NEGATIVE
PH BLDA: 7.67 PH UNITS (ref 7.35–7.45)
PH UR STRIP.AUTO: 6 [PH] (ref 5–8)
PH, TEMP CORRECTED: 7.67 PH UNITS
PHOSPHATE SERPL-MCNC: 1.2 MG/DL (ref 2.5–4.5)
PLATELET # BLD AUTO: 274 10*3/MM3 (ref 140–450)
PMV BLD AUTO: 9.3 FL (ref 6–12)
PO2 BLDA: 101 MM HG (ref 83–108)
PO2 TEMP ADJ BLD: 101 MM HG (ref 83–108)
POTASSIUM SERPL-SCNC: 3.7 MMOL/L (ref 3.5–5.2)
PROCALCITONIN SERPL-MCNC: 0.05 NG/ML (ref 0–0.25)
PROT SERPL-MCNC: 7.7 G/DL (ref 6–8.5)
PROT UR QL STRIP: ABNORMAL
QT INTERVAL: 426 MS
QTC INTERVAL: 469 MS
RBC # BLD AUTO: 4.21 10*6/MM3 (ref 4.14–5.8)
RSV RNA RESP QL NAA+PROBE: NOT DETECTED
SALICYLATES SERPL-MCNC: 3 MG/DL
SARS-COV-2 RNA RESP QL NAA+PROBE: NOT DETECTED
SODIUM SERPL-SCNC: 138 MMOL/L (ref 136–145)
SP GR UR STRIP: 1.04 (ref 1–1.03)
TOTAL RATE: 0 BREATHS/MINUTE
TRICYCLICS UR QL SCN: NEGATIVE
TROPONIN T DELTA: 1 NG/L
TROPONIN T SERPL HS-MCNC: 19 NG/L
TSH SERPL DL<=0.05 MIU/L-ACNC: 2.01 UIU/ML (ref 0.27–4.2)
UROBILINOGEN UR QL STRIP: ABNORMAL
VENTILATOR MODE: ABNORMAL
WBC NRBC COR # BLD AUTO: 15.97 10*3/MM3 (ref 3.4–10.8)

## 2024-05-04 PROCEDURE — 80143 DRUG ASSAY ACETAMINOPHEN: CPT | Performed by: STUDENT IN AN ORGANIZED HEALTH CARE EDUCATION/TRAINING PROGRAM

## 2024-05-04 PROCEDURE — 25010000002 DIPHENHYDRAMINE PER 50 MG: Performed by: STUDENT IN AN ORGANIZED HEALTH CARE EDUCATION/TRAINING PROGRAM

## 2024-05-04 PROCEDURE — 82805 BLOOD GASES W/O2 SATURATION: CPT

## 2024-05-04 PROCEDURE — 87040 BLOOD CULTURE FOR BACTERIA: CPT | Performed by: STUDENT IN AN ORGANIZED HEALTH CARE EDUCATION/TRAINING PROGRAM

## 2024-05-04 PROCEDURE — 86140 C-REACTIVE PROTEIN: CPT | Performed by: STUDENT IN AN ORGANIZED HEALTH CARE EDUCATION/TRAINING PROGRAM

## 2024-05-04 PROCEDURE — 99285 EMERGENCY DEPT VISIT HI MDM: CPT

## 2024-05-04 PROCEDURE — 83735 ASSAY OF MAGNESIUM: CPT | Performed by: STUDENT IN AN ORGANIZED HEALTH CARE EDUCATION/TRAINING PROGRAM

## 2024-05-04 PROCEDURE — G0378 HOSPITAL OBSERVATION PER HR: HCPCS

## 2024-05-04 PROCEDURE — 76705 ECHO EXAM OF ABDOMEN: CPT

## 2024-05-04 PROCEDURE — 25010000002 HEPARIN (PORCINE) PER 1000 UNITS: Performed by: INTERNAL MEDICINE

## 2024-05-04 PROCEDURE — 82565 ASSAY OF CREATININE: CPT

## 2024-05-04 PROCEDURE — 36600 WITHDRAWAL OF ARTERIAL BLOOD: CPT

## 2024-05-04 PROCEDURE — 80179 DRUG ASSAY SALICYLATE: CPT | Performed by: STUDENT IN AN ORGANIZED HEALTH CARE EDUCATION/TRAINING PROGRAM

## 2024-05-04 PROCEDURE — 80307 DRUG TEST PRSMV CHEM ANLYZR: CPT | Performed by: STUDENT IN AN ORGANIZED HEALTH CARE EDUCATION/TRAINING PROGRAM

## 2024-05-04 PROCEDURE — 0 GADOBENATE DIMEGLUMINE 529 MG/ML SOLUTION: Performed by: STUDENT IN AN ORGANIZED HEALTH CARE EDUCATION/TRAINING PROGRAM

## 2024-05-04 PROCEDURE — 84145 PROCALCITONIN (PCT): CPT | Performed by: STUDENT IN AN ORGANIZED HEALTH CARE EDUCATION/TRAINING PROGRAM

## 2024-05-04 PROCEDURE — 99222 1ST HOSP IP/OBS MODERATE 55: CPT | Performed by: INTERNAL MEDICINE

## 2024-05-04 PROCEDURE — 84100 ASSAY OF PHOSPHORUS: CPT | Performed by: STUDENT IN AN ORGANIZED HEALTH CARE EDUCATION/TRAINING PROGRAM

## 2024-05-04 PROCEDURE — 84484 ASSAY OF TROPONIN QUANT: CPT | Performed by: STUDENT IN AN ORGANIZED HEALTH CARE EDUCATION/TRAINING PROGRAM

## 2024-05-04 PROCEDURE — 83690 ASSAY OF LIPASE: CPT | Performed by: STUDENT IN AN ORGANIZED HEALTH CARE EDUCATION/TRAINING PROGRAM

## 2024-05-04 PROCEDURE — 82375 ASSAY CARBOXYHB QUANT: CPT

## 2024-05-04 PROCEDURE — 86308 HETEROPHILE ANTIBODY SCREEN: CPT | Performed by: STUDENT IN AN ORGANIZED HEALTH CARE EDUCATION/TRAINING PROGRAM

## 2024-05-04 PROCEDURE — 82550 ASSAY OF CK (CPK): CPT | Performed by: STUDENT IN AN ORGANIZED HEALTH CARE EDUCATION/TRAINING PROGRAM

## 2024-05-04 PROCEDURE — 87637 SARSCOV2&INF A&B&RSV AMP PRB: CPT | Performed by: STUDENT IN AN ORGANIZED HEALTH CARE EDUCATION/TRAINING PROGRAM

## 2024-05-04 PROCEDURE — 93005 ELECTROCARDIOGRAM TRACING: CPT | Performed by: STUDENT IN AN ORGANIZED HEALTH CARE EDUCATION/TRAINING PROGRAM

## 2024-05-04 PROCEDURE — 96372 THER/PROPH/DIAG INJ SC/IM: CPT

## 2024-05-04 PROCEDURE — 71275 CT ANGIOGRAPHY CHEST: CPT

## 2024-05-04 PROCEDURE — 96375 TX/PRO/DX INJ NEW DRUG ADDON: CPT

## 2024-05-04 PROCEDURE — 84443 ASSAY THYROID STIM HORMONE: CPT | Performed by: STUDENT IN AN ORGANIZED HEALTH CARE EDUCATION/TRAINING PROGRAM

## 2024-05-04 PROCEDURE — 25010000002 PROCHLORPERAZINE 10 MG/2ML SOLUTION: Performed by: STUDENT IN AN ORGANIZED HEALTH CARE EDUCATION/TRAINING PROGRAM

## 2024-05-04 PROCEDURE — 82948 REAGENT STRIP/BLOOD GLUCOSE: CPT

## 2024-05-04 PROCEDURE — 25010000002 DEXAMETHASONE SODIUM PHOSPHATE 100 MG/10ML SOLUTION: Performed by: INTERNAL MEDICINE

## 2024-05-04 PROCEDURE — A9577 INJ MULTIHANCE: HCPCS | Performed by: STUDENT IN AN ORGANIZED HEALTH CARE EDUCATION/TRAINING PROGRAM

## 2024-05-04 PROCEDURE — 83880 ASSAY OF NATRIURETIC PEPTIDE: CPT | Performed by: STUDENT IN AN ORGANIZED HEALTH CARE EDUCATION/TRAINING PROGRAM

## 2024-05-04 PROCEDURE — 82077 ASSAY SPEC XCP UR&BREATH IA: CPT | Performed by: STUDENT IN AN ORGANIZED HEALTH CARE EDUCATION/TRAINING PROGRAM

## 2024-05-04 PROCEDURE — 81003 URINALYSIS AUTO W/O SCOPE: CPT | Performed by: STUDENT IN AN ORGANIZED HEALTH CARE EDUCATION/TRAINING PROGRAM

## 2024-05-04 PROCEDURE — 85025 COMPLETE CBC W/AUTO DIFF WBC: CPT | Performed by: STUDENT IN AN ORGANIZED HEALTH CARE EDUCATION/TRAINING PROGRAM

## 2024-05-04 PROCEDURE — 80053 COMPREHEN METABOLIC PANEL: CPT | Performed by: STUDENT IN AN ORGANIZED HEALTH CARE EDUCATION/TRAINING PROGRAM

## 2024-05-04 PROCEDURE — 25010000002 KETOROLAC TROMETHAMINE PER 15 MG: Performed by: STUDENT IN AN ORGANIZED HEALTH CARE EDUCATION/TRAINING PROGRAM

## 2024-05-04 PROCEDURE — 25810000003 LACTATED RINGERS PER 1000 ML: Performed by: STUDENT IN AN ORGANIZED HEALTH CARE EDUCATION/TRAINING PROGRAM

## 2024-05-04 PROCEDURE — 70553 MRI BRAIN STEM W/O & W/DYE: CPT

## 2024-05-04 PROCEDURE — 83050 HGB METHEMOGLOBIN QUAN: CPT

## 2024-05-04 PROCEDURE — 83605 ASSAY OF LACTIC ACID: CPT | Performed by: STUDENT IN AN ORGANIZED HEALTH CARE EDUCATION/TRAINING PROGRAM

## 2024-05-04 PROCEDURE — 85379 FIBRIN DEGRADATION QUANT: CPT | Performed by: INTERNAL MEDICINE

## 2024-05-04 PROCEDURE — 25810000003 SODIUM CHLORIDE 0.9 % SOLUTION: Performed by: STUDENT IN AN ORGANIZED HEALTH CARE EDUCATION/TRAINING PROGRAM

## 2024-05-04 PROCEDURE — 96366 THER/PROPH/DIAG IV INF ADDON: CPT

## 2024-05-04 PROCEDURE — 25810000003 LACTATED RINGERS SOLUTION: Performed by: STUDENT IN AN ORGANIZED HEALTH CARE EDUCATION/TRAINING PROGRAM

## 2024-05-04 PROCEDURE — 36415 COLL VENOUS BLD VENIPUNCTURE: CPT

## 2024-05-04 PROCEDURE — 96365 THER/PROPH/DIAG IV INF INIT: CPT

## 2024-05-04 PROCEDURE — 25510000001 IOPAMIDOL PER 1 ML: Performed by: STUDENT IN AN ORGANIZED HEALTH CARE EDUCATION/TRAINING PROGRAM

## 2024-05-04 PROCEDURE — 96361 HYDRATE IV INFUSION ADD-ON: CPT

## 2024-05-04 PROCEDURE — 25010000002 METOCLOPRAMIDE PER 10 MG: Performed by: INTERNAL MEDICINE

## 2024-05-04 RX ORDER — METOCLOPRAMIDE HYDROCHLORIDE 5 MG/ML
5 INJECTION INTRAMUSCULAR; INTRAVENOUS EVERY 6 HOURS PRN
Status: DISCONTINUED | OUTPATIENT
Start: 2024-05-04 | End: 2024-05-05 | Stop reason: HOSPADM

## 2024-05-04 RX ORDER — DIPHENHYDRAMINE HYDROCHLORIDE 50 MG/ML
25 INJECTION INTRAMUSCULAR; INTRAVENOUS ONCE
Status: COMPLETED | OUTPATIENT
Start: 2024-05-04 | End: 2024-05-04

## 2024-05-04 RX ORDER — KETOROLAC TROMETHAMINE 15 MG/ML
15 INJECTION, SOLUTION INTRAMUSCULAR; INTRAVENOUS ONCE
Status: COMPLETED | OUTPATIENT
Start: 2024-05-04 | End: 2024-05-04

## 2024-05-04 RX ORDER — SODIUM CHLORIDE, SODIUM LACTATE, POTASSIUM CHLORIDE, CALCIUM CHLORIDE 600; 310; 30; 20 MG/100ML; MG/100ML; MG/100ML; MG/100ML
125 INJECTION, SOLUTION INTRAVENOUS CONTINUOUS
Status: DISCONTINUED | OUTPATIENT
Start: 2024-05-04 | End: 2024-05-05 | Stop reason: HOSPADM

## 2024-05-04 RX ORDER — FENTANYL/ROPIVACAINE/NS/PF 2-625MCG/1
15 PLASTIC BAG, INJECTION (ML) EPIDURAL
Qty: 750 ML | Refills: 0 | Status: COMPLETED | OUTPATIENT
Start: 2024-05-04 | End: 2024-05-05

## 2024-05-04 RX ORDER — HEPARIN SODIUM 5000 [USP'U]/ML
5000 INJECTION, SOLUTION INTRAVENOUS; SUBCUTANEOUS EVERY 12 HOURS SCHEDULED
Status: DISCONTINUED | OUTPATIENT
Start: 2024-05-04 | End: 2024-05-05 | Stop reason: HOSPADM

## 2024-05-04 RX ORDER — ROSUVASTATIN CALCIUM 20 MG/1
20 TABLET, COATED ORAL DAILY
Status: DISCONTINUED | OUTPATIENT
Start: 2024-05-05 | End: 2024-05-05 | Stop reason: HOSPADM

## 2024-05-04 RX ORDER — ACETAMINOPHEN 500 MG
1000 TABLET ORAL ONCE
Status: COMPLETED | OUTPATIENT
Start: 2024-05-04 | End: 2024-05-04

## 2024-05-04 RX ORDER — PROCHLORPERAZINE EDISYLATE 5 MG/ML
5 INJECTION INTRAMUSCULAR; INTRAVENOUS ONCE
Status: COMPLETED | OUTPATIENT
Start: 2024-05-04 | End: 2024-05-04

## 2024-05-04 RX ORDER — PANTOPRAZOLE SODIUM 40 MG/10ML
40 INJECTION, POWDER, LYOPHILIZED, FOR SOLUTION INTRAVENOUS
Status: DISCONTINUED | OUTPATIENT
Start: 2024-05-04 | End: 2024-05-05 | Stop reason: HOSPADM

## 2024-05-04 RX ADMIN — SODIUM CHLORIDE, POTASSIUM CHLORIDE, SODIUM LACTATE AND CALCIUM CHLORIDE 1000 ML: 600; 310; 30; 20 INJECTION, SOLUTION INTRAVENOUS at 16:05

## 2024-05-04 RX ADMIN — POTASSIUM PHOSPHATE, MONOBASIC POTASSIUM PHOSPHATE, DIBASIC 15 MMOL: 224; 236 INJECTION, SOLUTION, CONCENTRATE INTRAVENOUS at 23:48

## 2024-05-04 RX ADMIN — HEPARIN SODIUM 5000 UNITS: 5000 INJECTION INTRAVENOUS; SUBCUTANEOUS at 21:47

## 2024-05-04 RX ADMIN — GADOBENATE DIMEGLUMINE 20 ML: 529 INJECTION, SOLUTION INTRAVENOUS at 17:34

## 2024-05-04 RX ADMIN — KETOROLAC TROMETHAMINE 15 MG: 15 INJECTION, SOLUTION INTRAMUSCULAR; INTRAVENOUS at 16:01

## 2024-05-04 RX ADMIN — DIPHENHYDRAMINE HYDROCHLORIDE 25 MG: 50 INJECTION INTRAMUSCULAR; INTRAVENOUS at 16:01

## 2024-05-04 RX ADMIN — POTASSIUM PHOSPHATE, MONOBASIC POTASSIUM PHOSPHATE, DIBASIC 15 MMOL: 224; 236 INJECTION, SOLUTION, CONCENTRATE INTRAVENOUS at 18:47

## 2024-05-04 RX ADMIN — ACETAMINOPHEN 1000 MG: 500 TABLET ORAL at 16:06

## 2024-05-04 RX ADMIN — METOCLOPRAMIDE 5 MG: 5 INJECTION, SOLUTION INTRAMUSCULAR; INTRAVENOUS at 21:47

## 2024-05-04 RX ADMIN — DEXAMETHASONE SODIUM PHOSPHATE 10 MG: 10 INJECTION, SOLUTION INTRAMUSCULAR; INTRAVENOUS at 22:38

## 2024-05-04 RX ADMIN — PROCHLORPERAZINE EDISYLATE 5 MG: 5 INJECTION INTRAMUSCULAR; INTRAVENOUS at 16:01

## 2024-05-04 RX ADMIN — IOPAMIDOL 85 ML: 755 INJECTION, SOLUTION INTRAVENOUS at 16:40

## 2024-05-04 RX ADMIN — SODIUM CHLORIDE, POTASSIUM CHLORIDE, SODIUM LACTATE AND CALCIUM CHLORIDE 125 ML/HR: 600; 310; 30; 20 INJECTION, SOLUTION INTRAVENOUS at 18:47

## 2024-05-04 RX ADMIN — PANTOPRAZOLE SODIUM 40 MG: 40 INJECTION, POWDER, FOR SOLUTION INTRAVENOUS at 20:54

## 2024-05-04 NOTE — H&P
[facility] Medicine Services  HISTORY AND PHYSICAL    Patient Name: Chad Ohara  : 1950  MRN: [id number]  Primary Care Physician: Asha Joyce MD    Subjective   Subjective     Chief Complaint:headache    HPI:  Chad Ohara is a 74 y.o. male who  has a past medical history of Hypertension and Palpitations. who presented to the ED for the 3rd day in a row with intractable headache and nausea/vomiting.  The first day he was treated as a migraine cocktail and symptoms improved.  The second day he had a reassuring LP as well as other normal radiographic studies and felt better and went home.  Today, again acute onset of excruciating headache, followed by nausea and epigastric pain. NO blood in his emesis, no fever, chills or diarrhea.  On arrival patient appeared toxic but has responded to treatment.    Review of Systems   Patient denies weight loss, headaches, changes in vision, fever, chills, sore throat, shortness of breath, cough, change in urine output or habits, joint pain, rash, itching, numbness/tingling, weakness or bleeding.    Otherwise complete ROS is negative except as mentioned in the HPI.    Personal History     PMH: He  has a past medical history of Hypertension and Palpitations.   PSxH: He  has a past surgical history that includes Tonsillectomy and Popliteal venous aneurysm repair (Left, 2012).         FH: His family history includes Cancer in his mother and sister; Stroke (age of onset: 62) in his father.   SH: He  reports that he quit smoking about 22 years ago. His smoking use included cigarettes. He started smoking about 52 years ago. He has a 30 pack-year smoking history. He has never used smokeless tobacco. He reports current alcohol use. He reports current drug use. Drug: Marijuana.     Medications:  aspirin, bisoprolol, ibuprofen, lisinopril-hydrochlorothiazide, multivitamin with minerals, ondansetron ODT, rosuvastatin, and tadalafil    No Known  Allergies    Objective   Objective     Vital Signs:   Temp:  [98.1 °F (36.7 °C)] 98.1 °F (36.7 °C)  Heart Rate:  [45-98] 51  Resp:  [18] 18  BP: ()/(35-89) 126/75    Constitutional: Awake, alert, interactive and pleasant  Eyes: clear sclerae, no conjunctival injection  HENT: NCAT, mucous membranes moist  Neck: no masses or lymphadenopathy, trachea midline  Respiratory: good breath sounds bilaterally, respirations unlabored  Cardiovascular: RRR, no murmurs appreciated, palpable peripheral pulses  Abdomen:  soft, no HSM or masses palpable, not tender or distended  Musculoskeletal: No peripheral edema, clubbing or cyanosis, he is thin  Neurologic: Oriented x 3,                       Strength symmetric in all extremities                     Cranial Nerves grossly intact, speech clear  Skin: No rashes or jaundice, tan,  Psychiatric: Appropriate mood, insight      Result Review:  I have personally reviewed the results from the time of this admission   to 5/4/2024 19:24 EDT and agree with these findings:  [x]  Laboratory  [x]  Microbiology  [x]  Radiology  [x]  EKG/Telemetry   [x]  Cardiology/Vascular   []  Pathology  [x]  Old records  []  Other:  Most notable findings include: elevated WBC, creatinine, lactic acidosis       LAB RESULTS:      Lab 05/04/24  1623 05/03/24  1919 05/03/24  1528 05/02/24  0053 05/01/24 2055   WBC 15.97*  --  16.30*  --  15.56*   HEMOGLOBIN 13.6  --  13.0  --  13.3   HEMATOCRIT 39.8  --  37.4*  --  39.2   PLATELETS 274  --  263  --  266   NEUTROS ABS 12.78*  --  13.90*  --  13.13*   IMMATURE GRANS (ABS) 0.12*  --  0.15*  --  0.09*   LYMPHS ABS 2.13  --  1.45  --  1.78   MONOS ABS 0.87  --  0.73  --  0.52   EOS ABS 0.02  --  0.01  --  0.01   MCV 94.5  --  94.0  --  94.7   CRP <0.30  --   --   --   --    PROCALCITONIN 0.05  --  0.05  --   --    LACTATE 2.7* 1.4 3.0* 1.9 2.6*         Lab 05/04/24  1632 05/04/24  1629 05/04/24  1623 05/03/24  1528 05/01/24  2055   SODIUM  --   --  138 141 138    POTASSIUM  --   --  3.7 3.7 4.0   CHLORIDE  --   --  99 102 101   CO2  --   --  25.0 22.0 24.0   ANION GAP  --   --  14.0 17.0* 13.0   BUN  --   --  30* 33* 27*   CREATININE 1.70* 1.70* 1.74* 1.57* 1.27   EGFR  --   --  40.4* 45.7* 58.9*   GLUCOSE  --   --  111* 129* 148*   CALCIUM  --   --  10.1 9.8 9.8   MAGNESIUM  --   --  1.9 1.8  --    PHOSPHORUS  --   --  1.2* 0.8*  --    TSH  --   --  2.010 2.250  --          Lab 05/04/24  1623 05/03/24  1528 05/01/24  2055   TOTAL PROTEIN 7.7 7.4 7.9   ALBUMIN 4.3 4.1 4.3   GLOBULIN 3.4 3.3 3.6   ALT (SGPT) 23 19 19   AST (SGOT) 39 32 24   BILIRUBIN 0.6 0.6 0.5   ALK PHOS 85 76 86   LIPASE 30 25 36         Lab 05/04/24  1830 05/04/24  1623 05/03/24  1528   PROBNP  --  509.1 321.7   HSTROP T 20 19 17                 Lab 05/04/24  1609   PH, ARTERIAL 7.665*   PCO2, ARTERIAL 18.8*   PO2 .0   FIO2 21   HCO3 ART 21.4   BASE EXCESS ART 2.8*   CARBOXYHEMOGLOBIN 0.7     Brief Urine Lab Results  (Last result in the past 365 days)        Color   Clarity   Blood   Leuk Est   Nitrite   Protein   CREAT   Urine HCG        05/04/24 1703 Yellow   Clear   Negative   Negative   Negative   Trace                 COVID19   Date Value Ref Range Status   05/04/2024 Not Detected Not Detected - Ref. Range Final       CT Angiogram Chest Pulmonary Embolism    Addendum Date: 5/4/2024    ADDENDUM #1 ADDENDUM: Sagittal and coronal coronal reconstructions angiographic reconstructions are now available. These images include some reconstruction graphics superimposed on the aorta. No additional pathologic findings are seen, not mentioned in earlier CT scan report. In particular, there is now better evaluation of the bony structures and no evidence of acute bony trauma. Electronically Signed: Deigo Baires MD  5/4/2024 6:44 PM EDT  Workstation ID: [id number] ORIGINAL REPORT: CT ANGIOGRAM CHEST PULMONARY EMBOLISM Date of Exam: 5/4/2024 4:27 PM EDT Indication: acute severe low chest pain/epigastric pain,  negative abdominal workup yesterday. Comparison: No prior angiographic chest CT scan. Previous low-dose chest CT scan 1/7/2016 recent CTA of the abdomen pelvis 5/3/2024 Technique: Axial CT images were obtained of the chest initially without contrast, subsequently after the uneventful intravenous administration of 85 mL Isovue 370 utilizing pulmonary embolism protocol.  Reconstructed coronal and sagittal images were also  obtained. Automated exposure control and iterative construction methods were used. Findings: Unenhanced scan shows no evidence of intramural hematoma of the thoracic aorta or periaortic inflammation. Angiographic images show good contrast opacification of the thoracic aorta with no evidence of dissection, allowing for the usual cardiac pulsation artifact, and with maximal diameter of 3.6 cm. No aneurysm is seen. There is good contrast opacification of the pulmonary arteries. No filling defects are seen to suggest pulmonary embolic disease. There is no evidence of pericardial or pleural effusion and no evidence of significant mediastinal, hilar, or axillary adenopathy. There is moderate gastroesophageal reflux, with fluid in the esophagus to a level just below the thoracic inlet. The airways appear to be normally patent. Lungs appear clear of active disease, with what appears to be mild dependent atelectasis in the posterior lower lobes. Included images of the upper abdomen show a small whether of residual contrast in the renal collecting systems from prior study. No significant abnormalities are appreciated of the included portions of the liver, gallbladder, spleen, pancreas, or adrenal  glands. No upper abdominal free air or ascites or adenopathy is seen. Bowel loops are normal in caliber. Bony structures. Impression: 1. No evidence of pulmonary embolic disease, thoracic aortic aneurysm or dissection, pneumonia, or other active chest pathology. 2. Incidentally noted gastroesophageal reflux. Note:  Sagittal and coronal reconstruction images are not currently available on PACS. Exam is dictated now in order not to delay reporting. An addendum will be issued if/when reconstruction images become available. Electronically Signed: Diego Baires MD  5/4/2024 4:57 PM EDT  Workstation ID: [id number]    Result Date: 5/4/2024  CT ANGIOGRAM CHEST PULMONARY EMBOLISM Date of Exam: 5/4/2024 4:27 PM EDT Indication: acute severe low chest pain/epigastric pain, negative abdominal workup yesterday. Comparison: No prior angiographic chest CT scan. Previous low-dose chest CT scan 1/7/2016 recent CTA of the abdomen pelvis 5/3/2024 Technique: Axial CT images were obtained of the chest initially without contrast, subsequently after the uneventful intravenous administration of 85 mL Isovue 370 utilizing pulmonary embolism protocol.  Reconstructed coronal and sagittal images were also  obtained. Automated exposure control and iterative construction methods were used. Findings: Unenhanced scan shows no evidence of intramural hematoma of the thoracic aorta or periaortic inflammation. Angiographic images show good contrast opacification of the thoracic aorta with no evidence of dissection, allowing for the usual cardiac pulsation artifact, and with maximal diameter of 3.6 cm. No aneurysm is seen. There is good contrast opacification of the pulmonary arteries. No filling defects are seen to suggest pulmonary embolic disease. There is no evidence of pericardial or pleural effusion and no evidence of significant mediastinal, hilar, or axillary adenopathy. There is moderate gastroesophageal reflux, with fluid in the esophagus to a level just below the thoracic inlet. The airways appear to be normally patent. Lungs appear clear of active disease, with what appears to be mild dependent atelectasis in the posterior lower lobes. Included images of the upper abdomen show a small whether of residual contrast in the renal collecting systems from prior  study. No significant abnormalities are appreciated of the included portions of the liver, gallbladder, spleen, pancreas, or adrenal  glands. No upper abdominal free air or ascites or adenopathy is seen. Bowel loops are normal in caliber. Bony structures.     Impression: Impression: 1. No evidence of pulmonary embolic disease, thoracic aortic aneurysm or dissection, pneumonia, or other active chest pathology. 2. Incidentally noted gastroesophageal reflux. Note: Sagittal and coronal reconstruction images are not currently available on PACS. Exam is dictated now in order not to delay reporting. An addendum will be issued if/when reconstruction images become available. Electronically Signed: Diego Baires MD  5/4/2024 4:57 PM EDT  Workstation ID: [id number]    MRI Brain With & Without Contrast    Result Date: 5/4/2024  MRI BRAIN W WO CONTRAST Date of Exam: 5/4/2024 5:12 PM EDT Indication: severe headache with nausea and vomiting 3 days unlike any prior, negative CT's and LP.  Comparison: 5/3/2024 head CT scan Technique:  Routine multiplanar/multisequence sequence images of the brain were obtained before and after the uneventful administration of 20 mL Multihance. Findings: Previous head CT scan report noted no acute intracranial pathology. Head and neck CTA exam, also from 5/3/2024 noted no significant vascular stenosis. The current study shows no evidence of restricted diffusion to suggest acute infarction. Images are mildly motion degraded. There is age-appropriate generalized cerebral atrophy, and mild central white matter change consistent with minor changes of microvascular encephalopathy. There is a small amount of symmetric increased T2 signal around the occipital horns, at least in part due to chemical shift artifact. This is less extensive than would be expected with hypertension-related PRES syndrome. There is no evidence of mass, mass effect, edema, or evidence of previous infarction. No signal changes are seen  to suggest acute or chronic blood products. Sagittal images show the midline structures to appear grossly normal. Included paranasal sinuses and mastoids appear clear. No gross abnormalities are seen in the orbits. There is expected flow signal in the major intracranial arteries and major dural venous sinuses.. Postcontrast images show no evidence of pathologic brain or meningeal enhancement.     Impression: Impression: Chronic appearing changes of the aging brain. No evidence of acute intracranial disease is seen. Electronically Signed: Diego Baires MD  5/4/2024 6:17 PM EDT  Workstation ID: [id number]    US Gallbladder    Result Date: 5/4/2024  US GALLBLADDER Date of Exam: 5/4/2024 4:44 PM EDT Indication: severe epigastric pain, negative CT. Comparison: Abdominal CT scan 5/3/2024 Technique: Grayscale and color Doppler ultrasound evaluation of the right upper quadrant was performed. Findings: Pancreas is generally well seen and appears grossly normal. Right and left liver lobes appear normal in morphology and echotexture. There is expected portal vein and hepatic vein flow. No color flow filling defects are appreciated. Gallbladder is distended, typical for fasting state and shows no evidence of gallbladder wall edema or evidence of gallstones. Common duct is normal in caliber at 3 mm. Right kidney appears normal and measures 11.5 cm in length. No right upper quadrant ascites is seen.     Impression: Impression: Negative gallbladder ultrasound. Electronically Signed: Diego Baires MD  5/4/2024 5:33 PM EDT  Workstation ID: [id number]    CT Angiogram Abdomen Pelvis    Result Date: 5/3/2024  CT ANGIOGRAM ABDOMEN PELVIS Date of Exam: 5/3/2024 4:30 PM EDT Indication: severe acute epigastric pain with nausea. Comparison: None available. Technique: CTA of the abdomen and pelvis was performed after the uneventful intravenous administration of 85 cc Isovue-370 IV contrast . Reconstructed coronal and sagittal images were also  obtained. In addition, a 3-D volume rendered image was created for interpretation. Automated exposure control and iterative reconstruction methods were used. FINDINGS: Lung bases: Small hiatal hernia. Liver:No masses. No intrahepatic biliary ductal dilatation. Spleen:No masses. No perisplenic hematoma. Pancreas:No pancreatic masses. No evidence of pancreatitis. Gallbladder and common bile duct:No evidence of cholelithiasis. No evidence of cholecystitis. Adrenal glands:No adrenal masses Kidneys and ureters: Tiny hypodensities involving both kidneys statistically likely to represent small renal cysts. No calculi present within the ureters. Normal caliber ureters. Urinary bladder:No urinary bladder wall thickening. No bladder masses. Small bowel:Normal caliber small bowel. Large bowel: Colonic diverticulosis without evidence of diverticulitis. Appendix: Normal GENITOURINARY: Normal prostate Ascites or pneumoperitoneum:None. Adenopathy:None present Osseous structures: Degenerative changes of the hips and lumbar spine. No lytic or blastic disease. Other findings: Arterial phase of the CT angiogram of the abdomen and pelvis shows mild multifocal atheromatous disease without vessel occlusion or stenosis. No evidence of dissection. No rupture or hemorrhage. Venous phase demonstrates normal renal excretion.     Impression: No vessel occlusion or stenosis. Small hiatal hernia. Colonic diverticulosis without evidence of diverticulitis. No aortic dissection or rupture. Electronically Signed: Delano Metzger MD  5/3/2024 5:10 PM EDT  Workstation ID: [id number]    CT Head Without Contrast    Result Date: 5/3/2024  CT HEAD WO CONTRAST, CT ANGIOGRAM NECK, CT ANGIOGRAM HEAD Date of Exam: 5/3/2024 4:30 PM EDT Indication: acute worst headache of life. Comparison: 6/1/2018 Technique: Axial CT images were obtained of the head without contrast administration.  Automated exposure control and iterative construction methods were used. CT  angiogram of the head and neck obtained post administration of Isovue-370 IV contrast. Coronal and sagittal reformats are reviewed. 3D and/or MIP reformats were rendered at an independent workstation. Findings: No intracranial hemorrhage. Gray-white matter differentiation is maintained without evidence of an acute infarction. Multiple foci of decreased attenuation are present within the subcortical, deep cerebral, and periventricular white matter consistent with chronic small vessel/microangiopathic ischemic changes. No extra-axial mass or collection. The ventricles and sulci are prominent commensurate with involutional changes. The posterior fossa appears grossly normal. Sellar and suprasellar structures are normal. Orbital and periorbital soft tissues are normal. The paranasal sinuses, ethmoid air cells, and mastoid air cells are aerated. The bony calvarium is intact. FINDINGS: The arch of the aorta is normal. The common carotid arteries are normal. Minimal atheromatous disease of the left carotid bulb. The extracranial internal carotid arteries are normal. Mild atheromatous disease of the intracranial segments of the  internal carotid arteries.. The carotid termini are normal. The visualized branches of the anterior and middle cerebral arteries appear normal. The vertebral arteries arise from the subclavian arteries. The right vertebral artery is dominant. No evidence of vertebral artery dissection. The vertebral arteries supply the basilar artery. The basilar artery tip is normal. The basilar artery terminates in bilateral posterior cerebral arteries which appear normal. The visualized lung apices are clear. The airway is clear. The thyroid gland is normal. No cervical adenopathy. The infratemporal fossa is normal bilaterally. No intracranial mass or mass effect. No midline shift. No extra-axial mass or collection. Orbital and paranasal soft tissues are normal. The paranasal sinuses are clear. The mastoid air  cells are aerated. Degenerative changes of the spine.     Impression: Impression: No acute intracranial pathology. Mild atheromatous disease without vessel occlusion or stenosis. No intracranial hemorrhage. Electronically Signed: Delano Metzger MD  5/3/2024 5:02 PM EDT  Workstation ID: [id number]    CT Angiogram Head    Result Date: 5/3/2024  CT HEAD WO CONTRAST, CT ANGIOGRAM NECK, CT ANGIOGRAM HEAD Date of Exam: 5/3/2024 4:30 PM EDT Indication: acute worst headache of life. Comparison: 6/1/2018 Technique: Axial CT images were obtained of the head without contrast administration.  Automated exposure control and iterative construction methods were used. CT angiogram of the head and neck obtained post administration of Isovue-370 IV contrast. Coronal and sagittal reformats are reviewed. 3D and/or MIP reformats were rendered at an independent workstation. Findings: No intracranial hemorrhage. Gray-white matter differentiation is maintained without evidence of an acute infarction. Multiple foci of decreased attenuation are present within the subcortical, deep cerebral, and periventricular white matter consistent with chronic small vessel/microangiopathic ischemic changes. No extra-axial mass or collection. The ventricles and sulci are prominent commensurate with involutional changes. The posterior fossa appears grossly normal. Sellar and suprasellar structures are normal. Orbital and periorbital soft tissues are normal. The paranasal sinuses, ethmoid air cells, and mastoid air cells are aerated. The bony calvarium is intact. FINDINGS: The arch of the aorta is normal. The common carotid arteries are normal. Minimal atheromatous disease of the left carotid bulb. The extracranial internal carotid arteries are normal. Mild atheromatous disease of the intracranial segments of the  internal carotid arteries.. The carotid termini are normal. The visualized branches of the anterior and middle cerebral arteries appear normal. The  vertebral arteries arise from the subclavian arteries. The right vertebral artery is dominant. No evidence of vertebral artery dissection. The vertebral arteries supply the basilar artery. The basilar artery tip is normal. The basilar artery terminates in bilateral posterior cerebral arteries which appear normal. The visualized lung apices are clear. The airway is clear. The thyroid gland is normal. No cervical adenopathy. The infratemporal fossa is normal bilaterally. No intracranial mass or mass effect. No midline shift. No extra-axial mass or collection. Orbital and paranasal soft tissues are normal. The paranasal sinuses are clear. The mastoid air cells are aerated. Degenerative changes of the spine.     Impression: Impression: No acute intracranial pathology. Mild atheromatous disease without vessel occlusion or stenosis. No intracranial hemorrhage. Electronically Signed: Delano Metzger MD  5/3/2024 5:02 PM EDT  Workstation ID: [id number]    CT Angiogram Neck    Result Date: 5/3/2024  CT HEAD WO CONTRAST, CT ANGIOGRAM NECK, CT ANGIOGRAM HEAD Date of Exam: 5/3/2024 4:30 PM EDT Indication: acute worst headache of life. Comparison: 6/1/2018 Technique: Axial CT images were obtained of the head without contrast administration.  Automated exposure control and iterative construction methods were used. CT angiogram of the head and neck obtained post administration of Isovue-370 IV contrast. Coronal and sagittal reformats are reviewed. 3D and/or MIP reformats were rendered at an independent workstation. Findings: No intracranial hemorrhage. Gray-white matter differentiation is maintained without evidence of an acute infarction. Multiple foci of decreased attenuation are present within the subcortical, deep cerebral, and periventricular white matter consistent with chronic small vessel/microangiopathic ischemic changes. No extra-axial mass or collection. The ventricles and sulci are prominent commensurate with involutional  changes. The posterior fossa appears grossly normal. Sellar and suprasellar structures are normal. Orbital and periorbital soft tissues are normal. The paranasal sinuses, ethmoid air cells, and mastoid air cells are aerated. The bony calvarium is intact. FINDINGS: The arch of the aorta is normal. The common carotid arteries are normal. Minimal atheromatous disease of the left carotid bulb. The extracranial internal carotid arteries are normal. Mild atheromatous disease of the intracranial segments of the  internal carotid arteries.. The carotid termini are normal. The visualized branches of the anterior and middle cerebral arteries appear normal. The vertebral arteries arise from the subclavian arteries. The right vertebral artery is dominant. No evidence of vertebral artery dissection. The vertebral arteries supply the basilar artery. The basilar artery tip is normal. The basilar artery terminates in bilateral posterior cerebral arteries which appear normal. The visualized lung apices are clear. The airway is clear. The thyroid gland is normal. No cervical adenopathy. The infratemporal fossa is normal bilaterally. No intracranial mass or mass effect. No midline shift. No extra-axial mass or collection. Orbital and paranasal soft tissues are normal. The paranasal sinuses are clear. The mastoid air cells are aerated. Degenerative changes of the spine.     Impression: Impression: No acute intracranial pathology. Mild atheromatous disease without vessel occlusion or stenosis. No intracranial hemorrhage. Electronically Signed: Delano Metzger MD  5/3/2024 5:02 PM EDT  Workstation ID: [id number]    XR Chest 1 View    Result Date: 5/3/2024  PORTABLE CHEST HISTORY: Precordial chest pain. COMPARISON: None. FINDINGS: The heart is normal in size. The mediastinum is unremarkable. The lungs are clear. There is no pneumothorax. There is a surgical anchor in the left glenoid.    Impression: No acute cardiopulmonary process. Images  reviewed, interpreted, and dictated by Dr. Parker Parker. Transcribed by Nash Orona PA-C.     Results for orders placed during the hospital encounter of 05/24/17    Adult Transthoracic Echo Complete    Interpretation Summary  · Left ventricular systolic function is normal. Estimated EF = 55%.  · Right ventricular cavity is mildly dilated.  · Mild tricuspid valve regurgitation is present.  · saline study negative for pfo      The patient has started, but not completed, their COVID-19 vaccination series.    Assessment & Plan   Assessment / Plan       Intractable nausea and vomiting    Hypertension    Headache    Other chest pain    RODRÍGUEZ (acute kidney injury)      Assessment & Plan:  73 yo with HO HTN, HLP and CKD as well as THC use who presents with 3 days of intermittent excruciating headache and nausea and vomiting.    Intractable headache with vomiting  -possible migraine- responded to migraine cocktail  - will give dose of decadron, prn reglan  --patient counseled that THC could be contributing    RODRÍGUEZ with hypotension and hypovolemia  HOLD ACE/HCTZ/NSAIDs and Zebeta for now  -hydrate with IVFs  -check urine studies    GERD with fluid in esophagus and epigastric pain  -start PPI IV  -consider EGD if     HLP  -CK is elevated-possible from vomiting but will hold crestor    DVT prophylaxis:  Medical DVT prophylaxis orders are present.    CODE STATUS:FULL CODE     Expected Discharge  Expected Discharge Date: 5/5/2024; Expected Discharge Time:     Electronically signed by Aria Langston MD 05/04/24 19:24 EDT

## 2024-05-04 NOTE — ED PROVIDER NOTES
EMERGENCY DEPARTMENT ENCOUNTER    Pt Name: Chad Ohara  MRN: [id number]  Pt :   1950  Room Number:    Date of encounter:  2024  PCP: Asha Joyce MD  ED Provider: Daniel West MD    Historian: Patient, spouse      HPI:  Chief Complaint: Headache, epigastric pain and nausea        Context: Chad Ohara is a 74-year-old man who presents the emergency department for the third time in 4 days for recurrence of severe epigastric pain and severe headache.  He was seen in the ED a few days ago at that time head CT was negative and he felt better after medications was discharged was then seen again yesterday with more extensive workup CTA of the abdomen pelvis was negative and CTAs of the head and neck were negative lumbar puncture was negative he also felt better after medications and discharge has been taking the Zofran he was given but says around 11 AM he had recurrence of his severe epigastric pain and nausea but now is feeling it in his lower chest as well he is also had recurrence of the headache but feels like the abdominal pain preceded that.  He has never had a headache like this before except for the last few days.  He is accompanied by his wife who helps with the history she says no neurologic deficits.  She says she was worried earlier that he was having a heart attack.  No other complaints at this time.      PAST MEDICAL HISTORY  Past Medical History:   Diagnosis Date    Hypertension     Palpitations          PAST SURGICAL HISTORY  Past Surgical History:   Procedure Laterality Date    POPLITEAL VENOUS ANEURYSM REPAIR Left 2012    TONSILLECTOMY           FAMILY HISTORY  Family History   Problem Relation Age of Onset    Cancer Mother         breast CA    Cancer Sister     Stroke Father 62         SOCIAL HISTORY  Social History     Socioeconomic History    Marital status:    Tobacco Use    Smoking status: Former     Current packs/day: 0.00     Average packs/day: 1 pack/day  for 30.0 years (30.0 ttl pk-yrs)     Types: Cigarettes     Start date:      Quit date:      Years since quittin.3    Smokeless tobacco: Never   Vaping Use    Vaping status: Never Used   Substance and Sexual Activity    Alcohol use: Yes     Comment: on occasion    Drug use: Yes     Types: Marijuana    Sexual activity: Defer         ALLERGIES  Patient has no known allergies.        REVIEW OF SYSTEMS  Review of Systems       All systems reviewed and negative except for those discussed in HPI.       PHYSICAL EXAM    I have reviewed the triage vital signs and nursing notes.    ED Triage Vitals [24 1538]   Temp Heart Rate Resp BP SpO2   98.1 °F (36.7 °C) 64 18 (!) 78/35 97 %      Temp src Heart Rate Source Patient Position BP Location FiO2 (%)   Oral Monitor Sitting Left arm --       Physical Exam  GENERAL:   Appears ill, uncomfortable  HENT: Nares patent.  EYES: No scleral icterus.  CV: Regular rhythm, regular rate.  RESPIRATORY: Normal effort.  No audible wheezes, rales or rhonchi.  ABDOMEN: Soft, nontender  MUSCULOSKELETAL: No deformities.   NEURO: Alert, moves all extremities, follows commands.  SKIN: Warm, dry, no rash visualized.      LAB RESULTS  Recent Results (from the past 24 hour(s))   STAT Lactic Acid, Reflex    Collection Time: 24  7:19 PM    Specimen: Arm, Right; Blood   Result Value Ref Range    Lactate 1.4 0.5 - 2.0 mmol/L   Protein, CSF - Cerebrospinal Fluid, Lumbar Puncture    Collection Time: 24  7:52 PM    Specimen: Lumbar Puncture; Cerebrospinal Fluid   Result Value Ref Range    Protein, Total (CSF) 54.6 (H) 15.0 - 45.0 mg/dL   Glucose, CSF - Cerebrospinal Fluid, Lumbar Puncture    Collection Time: 24  7:52 PM    Specimen: Lumbar Puncture; Cerebrospinal Fluid   Result Value Ref Range    Glucose, CSF 61 40 - 70 mg/dL   Culture, CSF - Cerebrospinal Fluid, Lumbar Puncture    Collection Time: 24  7:52 PM    Specimen: Lumbar Puncture; Cerebrospinal Fluid   Result  Value Ref Range    CSF Culture No growth     Gram Stain No WBCs or organisms seen    Cell Count, CSF - Cerebrospinal Fluid, Lumbar Puncture    Collection Time: 05/03/24  7:52 PM    Specimen: Lumbar Puncture; Cerebrospinal Fluid   Result Value Ref Range    WBC, CSF 1 0 - 5 /mm3    RBC, CSF 10 (H) 0 - 5 /mm3    Color, CSF Colorless Colorless    Supernatant Color, CSF Colorless Colorless    Appearance, CSF Clear Clear    Volume, CSF 4.5 mL    Tube Number, CSF 4    Meningitis / Encephalitis Panel, PCR - Cerebrospinal Fluid, Lumbar Puncture    Collection Time: 05/03/24  7:52 PM    Specimen: Lumbar Puncture; Cerebrospinal Fluid   Result Value Ref Range    ESCHERICHIA COLI K1, PCR Not Detected Not Detected    HAEMOPHILUS INFLUENZAE, PCR Not Detected Not Detected    LISTERIA MONOCYTOGENES, PCR Not Detected Not Detected    NEISSERIA MENINGITIDIS, PCR Not Detected Not Detected    STREPTOCOCCUS AGALACTIAE, PCR Not Detected Not Detected    STREPTOCOCCUS PNEUMONIAE, PCR Not Detected Not Detected    CYTOMEGALOVIRUS (CMV), PCR Not Detected Not Detected    ENTEROVIRUS, PCR Not Detected Not Detected    HERPES SIMPLEX VIRUS 1 (HSV-1), PCR Not Detected Not Detected    HERPES SIMPLEX VIRUS 2 (HSV-2), PCR Not Detected Not Detected    HUMAN PARECHOVIRUS, PCR Not Detected Not Detected    VARICELLA ZOSTER VIRUS (VZV), PCR Not Detected Not Detected    CRYPTOCOCCUS NEOFORMANS / GATTII, PCR Not Detected Not Detected    HUMAN HERPES VIRUS 6 PCR Not Detected Not Detected   ECG 12 Lead Chest Pain    Collection Time: 05/04/24  3:54 PM   Result Value Ref Range    QT Interval 450 ms    QTC Interval 468 ms   Blood Gas, Arterial With Co-Ox    Collection Time: 05/04/24  4:09 PM    Specimen: Arterial Blood   Result Value Ref Range    Site Right Brachial     Dustin's Test N/A     pH, Arterial 7.665 (C) 7.350 - 7.450 pH units    pCO2, Arterial 18.8 (C) 35.0 - 45.0 mm Hg    pO2, Arterial 101.0 83.0 - 108.0 mm Hg    HCO3, Arterial 21.4 20.0 - 26.0 mmol/L     Base Excess, Arterial 2.8 (H) 0.0 - 2.0 mmol/L    Hemoglobin, Blood Gas 12.6 (L) 13.5 - 17.5 g/dL    Hematocrit, Blood Gas 38.7 38.0 - 51.0 %    Oxyhemoglobin 97.7 94 - 99 %    Methemoglobin 0.10 0.00 - 1.50 %    Carboxyhemoglobin 0.7 0 - 2 %    CO2 Content 22.0 22 - 33 mmol/L    Temperature 37.0     Barometric Pressure for Blood Gas      Modality Room Air     FIO2 21 %    Ventilator Mode      Rate 0 Breaths/minute    PIP 0 cmH2O    IPAP 0     EPAP 0     Notified Who MD CARLYN     Notified By [id number]     Notified Time 05/04/2024 16:10     pH, Temp Corrected 7.665 pH Units    pCO2, Temperature Corrected 18.8 (L) 35 - 48 mm Hg    pO2, Temperature Corrected 101 83 - 108 mm Hg   COVID-19, FLU A/B, RSV PCR 1 HR TAT - Swab, Nasopharynx    Collection Time: 05/04/24  4:11 PM    Specimen: Nasopharynx; Swab   Result Value Ref Range    COVID19 Not Detected Not Detected - Ref. Range    Influenza A PCR Not Detected Not Detected    Influenza B PCR Not Detected Not Detected    RSV, PCR Not Detected Not Detected   Comprehensive Metabolic Panel    Collection Time: 05/04/24  4:23 PM    Specimen: Blood   Result Value Ref Range    Glucose 111 (H) 65 - 99 mg/dL    BUN 30 (H) 8 - 23 mg/dL    Creatinine 1.74 (H) 0.76 - 1.27 mg/dL    Sodium 138 136 - 145 mmol/L    Potassium 3.7 3.5 - 5.2 mmol/L    Chloride 99 98 - 107 mmol/L    CO2 25.0 22.0 - 29.0 mmol/L    Calcium 10.1 8.6 - 10.5 mg/dL    Total Protein 7.7 6.0 - 8.5 g/dL    Albumin 4.3 3.5 - 5.2 g/dL    ALT (SGPT) 23 1 - 41 U/L    AST (SGOT) 39 1 - 40 U/L    Alkaline Phosphatase 85 39 - 117 U/L    Total Bilirubin 0.6 0.0 - 1.2 mg/dL    Globulin 3.4 gm/dL    A/G Ratio 1.3 g/dL    BUN/Creatinine Ratio 17.2 7.0 - 25.0    Anion Gap 14.0 5.0 - 15.0 mmol/L    eGFR 40.4 (L) >60.0 mL/min/1.73   Lipase    Collection Time: 05/04/24  4:23 PM    Specimen: Blood   Result Value Ref Range    Lipase 30 13 - 60 U/L   BNP    Collection Time: 05/04/24  4:23 PM    Specimen: Blood   Result Value Ref Range     proBNP 509.1 0.0 - 1,800.0 pg/mL   High Sensitivity Troponin T    Collection Time: 05/04/24  4:23 PM    Specimen: Blood   Result Value Ref Range    HS Troponin T 19 <22 ng/L   Lactic Acid, Plasma    Collection Time: 05/04/24  4:23 PM    Specimen: Blood   Result Value Ref Range    Lactate 2.7 (C) 0.5 - 2.0 mmol/L   Procalcitonin    Collection Time: 05/04/24  4:23 PM    Specimen: Blood   Result Value Ref Range    Procalcitonin 0.05 0.00 - 0.25 ng/mL   C-reactive Protein    Collection Time: 05/04/24  4:23 PM    Specimen: Blood   Result Value Ref Range    C-Reactive Protein <0.30 0.00 - 0.50 mg/dL   Mononucleosis Screen    Collection Time: 05/04/24  4:23 PM    Specimen: Blood   Result Value Ref Range    Monospot Negative Negative   Magnesium    Collection Time: 05/04/24  4:23 PM    Specimen: Blood   Result Value Ref Range    Magnesium 1.9 1.6 - 2.4 mg/dL   Phosphorus    Collection Time: 05/04/24  4:23 PM    Specimen: Blood   Result Value Ref Range    Phosphorus 1.2 (C) 2.5 - 4.5 mg/dL   TSH    Collection Time: 05/04/24  4:23 PM    Specimen: Blood   Result Value Ref Range    TSH 2.010 0.270 - 4.200 uIU/mL   CK    Collection Time: 05/04/24  4:23 PM    Specimen: Blood   Result Value Ref Range    Creatine Kinase 289 (H) 20 - 200 U/L   CBC Auto Differential    Collection Time: 05/04/24  4:23 PM    Specimen: Blood   Result Value Ref Range    WBC 15.97 (H) 3.40 - 10.80 10*3/mm3    RBC 4.21 4.14 - 5.80 10*6/mm3    Hemoglobin 13.6 13.0 - 17.7 g/dL    Hematocrit 39.8 37.5 - 51.0 %    MCV 94.5 79.0 - 97.0 fL    MCH 32.3 26.6 - 33.0 pg    MCHC 34.2 31.5 - 35.7 g/dL    RDW 13.2 12.3 - 15.4 %    RDW-SD 46.2 37.0 - 54.0 fl    MPV 9.3 6.0 - 12.0 fL    Platelets 274 140 - 450 10*3/mm3    Neutrophil % 80.1 (H) 42.7 - 76.0 %    Lymphocyte % 13.3 (L) 19.6 - 45.3 %    Monocyte % 5.4 5.0 - 12.0 %    Eosinophil % 0.1 (L) 0.3 - 6.2 %    Basophil % 0.3 0.0 - 1.5 %    Immature Grans % 0.8 (H) 0.0 - 0.5 %    Neutrophils, Absolute 12.78 (H)  1.70 - 7.00 10*3/mm3    Lymphocytes, Absolute 2.13 0.70 - 3.10 10*3/mm3    Monocytes, Absolute 0.87 0.10 - 0.90 10*3/mm3    Eosinophils, Absolute 0.02 0.00 - 0.40 10*3/mm3    Basophils, Absolute 0.05 0.00 - 0.20 10*3/mm3    Immature Grans, Absolute 0.12 (H) 0.00 - 0.05 10*3/mm3    nRBC 0.0 0.0 - 0.2 /100 WBC   Ethanol    Collection Time: 05/04/24  4:23 PM    Specimen: Blood   Result Value Ref Range    Ethanol <10 0 - 10 mg/dL   Acetaminophen Level    Collection Time: 05/04/24  4:23 PM    Specimen: Blood   Result Value Ref Range    Acetaminophen 6.5 0.0 - 30.0 mcg/mL   Salicylate Level    Collection Time: 05/04/24  4:23 PM    Specimen: Blood   Result Value Ref Range    Salicylate 3.0 <=30.0 mg/dL   POC Creatinine    Collection Time: 05/04/24  4:29 PM    Specimen: Blood   Result Value Ref Range    Creatinine 1.70 (H) 0.60 - 1.30 mg/dL   POC Creatinine    Collection Time: 05/04/24  4:32 PM    Specimen: Blood   Result Value Ref Range    Creatinine 1.70 (A) 0.60 - 1.30 mg/dL   Urinalysis With Microscopic If Indicated (No Culture) - Urine, Clean Catch    Collection Time: 05/04/24  5:03 PM    Specimen: Urine, Clean Catch   Result Value Ref Range    Color, UA Yellow Yellow, Straw    Appearance, UA Clear Clear    pH, UA 6.0 5.0 - 8.0    Specific Gravity, UA 1.040 (H) 1.001 - 1.030    Glucose, UA Negative Negative    Ketones, UA 15 mg/dL (1+) (A) Negative    Bilirubin, UA Negative Negative    Blood, UA Negative Negative    Protein, UA Trace (A) Negative    Leuk Esterase, UA Negative Negative    Nitrite, UA Negative Negative    Urobilinogen, UA 0.2 E.U./dL 0.2 - 1.0 E.U./dL   Urine Drug Screen - Urine, Clean Catch    Collection Time: 05/04/24  5:03 PM    Specimen: Urine, Clean Catch   Result Value Ref Range    THC, Screen, Urine Positive (A) Negative    Phencyclidine (PCP), Urine Negative Negative    Cocaine Screen, Urine Negative Negative    Methamphetamine, Ur Negative Negative    Opiate Screen Negative Negative     Amphetamine Screen, Urine Negative Negative    Benzodiazepine Screen, Urine Negative Negative    Tricyclic Antidepressants Screen Negative Negative    Methadone Screen, Urine Negative Negative    Barbiturates Screen, Urine Negative Negative    Oxycodone Screen, Urine Negative Negative    Buprenorphine, Screen, Urine Negative Negative   Fentanyl, Urine - Urine, Clean Catch    Collection Time: 05/04/24  5:03 PM    Specimen: Urine, Clean Catch   Result Value Ref Range    Fentanyl, Urine Negative Negative       If labs were ordered, I independently reviewed the results and considered them in treating the patient.        RADIOLOGY  MRI Brain With & Without Contrast    Result Date: 5/4/2024  MRI BRAIN W WO CONTRAST Date of Exam: 5/4/2024 5:12 PM EDT Indication: severe headache with nausea and vomiting 3 days unlike any prior, negative CT's and LP.  Comparison: 5/3/2024 head CT scan Technique:  Routine multiplanar/multisequence sequence images of the brain were obtained before and after the uneventful administration of 20 mL Multihance. Findings: Previous head CT scan report noted no acute intracranial pathology. Head and neck CTA exam, also from 5/3/2024 noted no significant vascular stenosis. The current study shows no evidence of restricted diffusion to suggest acute infarction. Images are mildly motion degraded. There is age-appropriate generalized cerebral atrophy, and mild central white matter change consistent with minor changes of microvascular encephalopathy. There is a small amount of symmetric increased T2 signal around the occipital horns, at least in part due to chemical shift artifact. This is less extensive than would be expected with hypertension-related PRES syndrome. There is no evidence of mass, mass effect, edema, or evidence of previous infarction. No signal changes are seen to suggest acute or chronic blood products. Sagittal images show the midline structures to appear grossly normal. Included  paranasal sinuses and mastoids appear clear. No gross abnormalities are seen in the orbits. There is expected flow signal in the major intracranial arteries and major dural venous sinuses.. Postcontrast images show no evidence of pathologic brain or meningeal enhancement.     Impression: Chronic appearing changes of the aging brain. No evidence of acute intracranial disease is seen. Electronically Signed: Diego Baires MD  5/4/2024 6:17 PM EDT  Workstation ID: [id number]    US Gallbladder    Result Date: 5/4/2024  US GALLBLADDER Date of Exam: 5/4/2024 4:44 PM EDT Indication: severe epigastric pain, negative CT. Comparison: Abdominal CT scan 5/3/2024 Technique: Grayscale and color Doppler ultrasound evaluation of the right upper quadrant was performed. Findings: Pancreas is generally well seen and appears grossly normal. Right and left liver lobes appear normal in morphology and echotexture. There is expected portal vein and hepatic vein flow. No color flow filling defects are appreciated. Gallbladder is distended, typical for fasting state and shows no evidence of gallbladder wall edema or evidence of gallstones. Common duct is normal in caliber at 3 mm. Right kidney appears normal and measures 11.5 cm in length. No right upper quadrant ascites is seen.     Impression: Negative gallbladder ultrasound. Electronically Signed: Diego Baires MD  5/4/2024 5:33 PM EDT  Workstation ID: [id number]    CT Angiogram Chest Pulmonary Embolism    Result Date: 5/4/2024  CT ANGIOGRAM CHEST PULMONARY EMBOLISM Date of Exam: 5/4/2024 4:27 PM EDT Indication: acute severe low chest pain/epigastric pain, negative abdominal workup yesterday. Comparison: No prior angiographic chest CT scan. Previous low-dose chest CT scan 1/7/2016 recent CTA of the abdomen pelvis 5/3/2024 Technique: Axial CT images were obtained of the chest initially without contrast, subsequently after the uneventful intravenous administration of 85 mL Isovue 370 utilizing  pulmonary embolism protocol.  Reconstructed coronal and sagittal images were also  obtained. Automated exposure control and iterative construction methods were used. Findings: Unenhanced scan shows no evidence of intramural hematoma of the thoracic aorta or periaortic inflammation. Angiographic images show good contrast opacification of the thoracic aorta with no evidence of dissection, allowing for the usual cardiac pulsation artifact, and with maximal diameter of 3.6 cm. No aneurysm is seen. There is good contrast opacification of the pulmonary arteries. No filling defects are seen to suggest pulmonary embolic disease. There is no evidence of pericardial or pleural effusion and no evidence of significant mediastinal, hilar, or axillary adenopathy. There is moderate gastroesophageal reflux, with fluid in the esophagus to a level just below the thoracic inlet. The airways appear to be normally patent. Lungs appear clear of active disease, with what appears to be mild dependent atelectasis in the posterior lower lobes. Included images of the upper abdomen show a small whether of residual contrast in the renal collecting systems from prior study. No significant abnormalities are appreciated of the included portions of the liver, gallbladder, spleen, pancreas, or adrenal  glands. No upper abdominal free air or ascites or adenopathy is seen. Bowel loops are normal in caliber. Bony structures.     Impression: 1. No evidence of pulmonary embolic disease, thoracic aortic aneurysm or dissection, pneumonia, or other active chest pathology. 2. Incidentally noted gastroesophageal reflux. Note: Sagittal and coronal reconstruction images are not currently available on PACS. Exam is dictated now in order not to delay reporting. An addendum will be issued if/when reconstruction images become available. Electronically Signed: Diego Baires MD  5/4/2024 4:57 PM EDT  Workstation ID: [id number]     I ordered and independently reviewed the  above noted radiographic studies.      I viewed images of CTA chest which showed no dissection pulmonary embolism or other acute pathology that I can appreciate.  Per my independent interpretation.  Gallbladder ultrasound shows no stones.  MRI of the brain does not show any acute bleeds masses infarctions or other acute pathology that I can appreciate.    See radiologist's dictation for official interpretation.        PROCEDURES    Procedures    ECG 12 Lead Chest Pain   Preliminary Result   Test Reason : Chest Pain   Blood Pressure :   */*   mmHG   Vent. Rate :  65 BPM     Atrial Rate :  65 BPM      P-R Int : 152 ms          QRS Dur :  90 ms       QT Int : 450 ms       P-R-T Axes :  69  18  43 degrees      QTc Int : 468 ms      Normal sinus rhythm   Septal infarct , age undetermined   Abnormal ECG   When compared with ECG of 03-MAY-2024 15:18, (Unconfirmed)   Septal infarct is now present      Referred By: EDMD           Confirmed By:           MEDICATIONS GIVEN IN ER    Medications   Phosphorus Replacement - Follow Nurse / BPA Driven Protocol (has no administration in time range)   lactated ringers infusion (has no administration in time range)   potassium phosphate 15 mmol in 0.9% normal saline 250 mL IVPB (has no administration in time range)   lactated ringers bolus 1,000 mL (0 mL Intravenous Stopped 5/4/24 1826)   prochlorperazine (COMPAZINE) injection 5 mg (5 mg Intravenous Given 5/4/24 1601)   diphenhydrAMINE (BENADRYL) injection 25 mg (25 mg Intravenous Given 5/4/24 1601)   acetaminophen (TYLENOL) tablet 1,000 mg (1,000 mg Oral Given 5/4/24 1606)   ketorolac (TORADOL) injection 15 mg (15 mg Intravenous Given 5/4/24 1601)   iopamidol (ISOVUE-370) 76 % injection 100 mL (85 mL Intravenous Given 5/4/24 1640)   gadobenate dimeglumine (MULTIHANCE) injection 20 mL (20 mL Intravenous Given 5/4/24 1734)         MEDICAL DECISION MAKING, PROGRESS, and CONSULTS    All labs, if obtained, have been independently reviewed  by me.  All radiology studies, if obtained, have been reviewed by me and the radiologist dictating the report.  All EKG's, if obtained, have been independently viewed and interpreted by me/my attending physician.      Discussion below represents my analysis of pertinent findings related to patient's condition, differential diagnosis, treatment plan and final disposition.                         Differential diagnosis:  Meningitis, migraine headache, brain tumor, pancreatitis, cholecystitis, bowel obstruction, volvulus, ileus, peptic ulcer disease, myocardial infarction, aortic dissection, pulmonary embolism, pneumonia, pneumothorax, anemia, electrolyte abnormality      Additional sources:    - Discussed/ obtained information from independent historians: Spouse    - External (non-ED) record review:  Chart review shows oncology notes for persistent leukocytosis and cardiology notes for palpitations and nonsustained V. tach.    - Chronic or social conditions impacting care: Hypertension, palpitations, persistent leukocytosis    - Shared decision making: Agreeable to hospital admission      Orders placed during this visit:  Orders Placed This Encounter   Procedures    COVID PRE-OP / PRE-PROCEDURE SCREENING ORDER (NO ISOLATION) - Swab, Nasopharynx    Blood Culture - Blood,    Blood Culture - Blood,    COVID-19, FLU A/B, RSV PCR 1 HR TAT - Swab, Nasopharynx    CT Angiogram Chest Pulmonary Embolism    US Gallbladder    MRI Brain With & Without Contrast    Comprehensive Metabolic Panel    Lipase    Urinalysis With Microscopic If Indicated (No Culture) - Urine, Clean Catch    Blood Gas, Arterial -With Co-Ox Panel: Yes    BNP    High Sensitivity Troponin T    Lactic Acid, Plasma    Procalcitonin    C-reactive Protein    Mononucleosis Screen    Magnesium    Phosphorus    TSH    CK    CBC Auto Differential    Blood Gas, Arterial With Co-Ox    High Sensitivity Troponin T 2Hr    STAT Lactic Acid, Reflex    Urine Drug Screen -  Urine, Clean Catch    Ethanol    Acetaminophen Level    Salicylate Level    Fentanyl, Urine - Urine, Clean Catch    Phosphorus    Pressure bag fluids  Misc Nursing Order (Specify)    Monitor Blood Pressure    Vital Signs Recheck    POC Creatinine    POC Creatinine    ECG 12 Lead Chest Pain    ED Bed Request    CBC & Differential         Additional orders considered but not ordered:      ED Course:    Consultants: Hospitalist    ED Course as of 05/04/24 1840   Sat May 04, 2024   0203 Chart review shows oncology notes for persistent leukocytosis and cardiology notes for palpitations and nonsustained V. tach. [CC]   1549 This very nice 74-year-old man who presents the emergency department for the third time in 4 days for recurrence of severe epigastric pain and severe headache.  He was seen in the ED a few days ago at that time head CT was negative and he felt better after medications was discharged was then seen again yesterday with more extensive workup CTA of the abdomen pelvis was negative and CTAs of the head and neck were negative lumbar puncture was negative he also felt better after medications and discharge has been taking the Zofran he was given but says around 11 AM he had recurrence of his severe epigastric pain and nausea but now is feeling it in his lower chest as well he is also had recurrence of the headache but feels like the abdominal pain preceded that.  He has never had a headache like this before except for the last few days.  He is accompanied by his wife who helps with the history she says no neurologic deficits.  She says she was worried earlier that he was having a heart attack.  No other complaints at this time. [CC]   1551 He arrived awake and alert but in obvious discomfort initial triage pressure 78/35 [CC]   1551 But without tachycardia immediately taken to her room placing on the monitor and pressure bagging a liter of fluids obtaining full cardiac workup including CTA of the chest.  With  negative head CTs and LP and his headache being different than he is ever had before now proceeding to MRI of the brain with and without contrast.  Treating with migraine medications.  Will reevaluate pending initial workup. [CC]   1743 CT of the chest does not show any acute pathology.  Gallbladder ultrasound does not show any pathology no gallstones.  Blood gas shows respiratory alkalosis consistent with hyperventilation his wife says when they were driving here he had an episode of pain and was hyperventilating.  CBC continues to demonstrate his chronic leukocytosis.  COVID and flu swab negative.  Mildly elevated CK at 289 already receiving fluids. [CC]   1743 Creatinine continues to trend upward at 1.74 now meeting criteria for acute kidney injury when compared with values from prior to his illness.  Mild lactic acidemia lactate 2.7.  This was similar yesterday I hope should resolve with fluids.  Continues to demonstrate hypophosphatemia which I am repleting.  No elevation in high-sensitivity troponin. [CC]   1838 Brain MRI negative.  UDS was added on his positive for THC I discussed with the patient he says he does use THC daily we discussed at length the pathophysiology of cannabis hyperemesis syndrome and I think this is at the very least contributing to his symptoms in a major way.  With that said he now has acute kidney injury as well as significant hypophosphatemia and ongoing symptoms I think needs hospitalization they are agreeable to this.  Discussed with the hospitalist team for admission. [CC]      ED Course User Index  [CC] Daniel West MD              Shared Decision Making:  After my consideration of clinical presentation and any laboratory/radiology studies obtained, I discussed the findings with the patient/patient representative who is in agreement with the treatment plan and the final disposition.   Risks and benefits of discharge and/or observation/admission were discussed.       AS  OF 18:40 EDT VITALS:    BP - 121/88  HR - (!) 45  TEMP - 98.1 °F (36.7 °C) (Oral)  O2 SATS - 98%                  DIAGNOSIS  Final diagnoses:   Intractable nausea and vomiting   Acute nonintractable headache, unspecified headache type   Acute kidney injury   Hypophosphatemia   Tetrahydrocannabinol (THC) dependence         DISPOSITION  Admit      Please note that portions of this document were completed with voice recognition software.        Daniel West MD  05/04/24 [id number]

## 2024-05-04 NOTE — Clinical Note
Level of Care: Telemetry [5]   Diagnosis: Intractable headache [247181]   Admitting Physician: NIKI ELISE [[id number]]

## 2024-05-04 NOTE — ED NOTES
" Chad Ohara    Nursing Report ED to Floor:  Mental status: AXO X4  Ambulatory status: UP AT VANDANA  Oxygen Therapy:  RA  Cardiac Rhythm: NSR  Admitted from: HOME  Safety Concerns:  NONE  Social Issues: NONE  ED Room #:  04    ED Nurse Phone Extension - [phone] or may call [phone].      HPI:   Chief Complaint   Patient presents with    Abdominal Pain       Past Medical History:  Past Medical History:   Diagnosis Date    Hypertension     Palpitations         Past Surgical History:  Past Surgical History:   Procedure Laterality Date    POPLITEAL VENOUS ANEURYSM REPAIR Left 2012    TONSILLECTOMY          Admitting Doctor:   Barbi Montes DO    Consulting Provider(s):  Consults       No orders found from 4/5/2024 to 5/5/2024.             Admitting Diagnosis:   There were no encounter diagnoses.    Most Recent Vitals:   Vitals:    05/04/24 1538 05/04/24 1558 05/04/24 1639   BP: (!) 78/35 152/83    BP Location: Left arm     Patient Position: Sitting     Pulse: 64 98 63   Resp: 18     Temp: 98.1 °F (36.7 °C)     TempSrc: Oral     SpO2: 97% 100% 96%   Weight: 90.7 kg (200 lb)     Height: 185.4 cm (73\")         Active LDAs/IV Access:   Lines, Drains & Airways       Active LDAs       Name Placement date Placement time Site Days    Peripheral IV 05/04/24 1601 Anterior;Distal;Left;Upper Arm 05/04/24  1601  Arm  less than 1                    Labs (abnormal labs have a star):   Labs Reviewed   COMPREHENSIVE METABOLIC PANEL - Abnormal; Notable for the following components:       Result Value    Glucose 111 (*)     BUN 30 (*)     Creatinine 1.74 (*)     eGFR 40.4 (*)     All other components within normal limits    Narrative:     GFR Normal >60  Chronic Kidney Disease <60  Kidney Failure <15    The GFR formula is only valid for adults with stable renal function between ages 18 and 70.   URINALYSIS W/ MICROSCOPIC IF INDICATED (NO CULTURE) - Abnormal; Notable for the following components:    Specific Gravity, UA 1.040 (*)     " Ketones, UA 15 mg/dL (1+) (*)     Protein, UA Trace (*)     All other components within normal limits    Narrative:     Urine microscopic not indicated.   LACTIC ACID, PLASMA - Abnormal; Notable for the following components:    Lactate 2.7 (*)     All other components within normal limits   PHOSPHORUS - Abnormal; Notable for the following components:    Phosphorus 1.2 (*)     All other components within normal limits   CK - Abnormal; Notable for the following components:    Creatine Kinase 289 (*)     All other components within normal limits   CBC WITH AUTO DIFFERENTIAL - Abnormal; Notable for the following components:    WBC 15.97 (*)     Neutrophil % 80.1 (*)     Lymphocyte % 13.3 (*)     Eosinophil % 0.1 (*)     Immature Grans % 0.8 (*)     Neutrophils, Absolute 12.78 (*)     Immature Grans, Absolute 0.12 (*)     All other components within normal limits   BLOOD GAS, ARTERIAL W/CO-OXIMETRY - Abnormal; Notable for the following components:    pH, Arterial 7.665 (*)     pCO2, Arterial 18.8 (*)     Base Excess, Arterial 2.8 (*)     Hemoglobin, Blood Gas 12.6 (*)     pCO2, Temperature Corrected 18.8 (*)     All other components within normal limits   URINE DRUG SCREEN - Abnormal; Notable for the following components:    THC, Screen, Urine Positive (*)     All other components within normal limits    Narrative:     Cutoff For Drugs Screened:    Amphetamines               500 ng/ml  Barbiturates               200 ng/ml  Benzodiazepines            150 ng/ml  Cocaine                    150 ng/ml  Methadone                  200 ng/ml  Opiates                    100 ng/ml  Phencyclidine               25 ng/ml  THC                         50 ng/ml  Methamphetamine            500 ng/ml  Tricyclic Antidepressants  300 ng/ml  Oxycodone                  100 ng/ml  Buprenorphine               10 ng/ml    The normal value for all drugs tested is negative. This report includes unconfirmed screening results, with the cutoff values  listed, to be used for medical treatment purposes only.  Unconfirmed results must not be used for non-medical purposes such as employment or legal testing.  Clinical consideration should be applied to any drug of abuse test, particularly when unconfirmed results are used.     POCT CREATININE - Abnormal; Notable for the following components:    Creatinine 1.70 (*)     All other components within normal limits   POCT CREATININE - Abnormal; Notable for the following components:    Creatinine 1.70 (*)     All other components within normal limits   COVID-19/FLUA&B/RSV, NP SWAB IN TRANSPORT MEDIA 1 HR TAT - Normal    Narrative:     Fact sheet for providers: https://www.fda.gov/media/250190/download    Fact sheet for patients: https://www.fda.gov/media/220422/download    Test performed by PCR.   LIPASE - Normal   BNP (IN-HOUSE) - Normal    Narrative:     This assay is used as an aid in the diagnosis of individuals suspected of having heart failure. It can be used as an aid in the diagnosis of acute decompensated heart failure (ADHF) in patients presenting with signs and symptoms of ADHF to the emergency department (ED). In addition, NT-proBNP of <300 pg/mL indicates ADHF is not likely.    Age Range Result Interpretation  NT-proBNP Concentration (pg/mL:      <50             Positive            >450                   Gray                 300-450                    Negative             <300    50-75           Positive            >900                  Gray                300-900                  Negative            <300      >75             Positive            >1800                  Gray                300-1800                  Negative            <300   TROPONIN - Normal    Narrative:     High Sensitive Troponin T Reference Range:  <14.0 ng/L- Negative Female for AMI  <22.0 ng/L- Negative Male for AMI  >=14 - Abnormal Female indicating possible myocardial injury.  >=22 - Abnormal Male indicating possible myocardial injury.  "  Clinicians would have to utilize clinical acumen, EKG, Troponin, and serial changes to determine if it is an Acute Myocardial Infarction or myocardial injury due to an underlying chronic condition.        PROCALCITONIN - Normal    Narrative:     As a Marker for Sepsis (Non-Neonates):    1. <0.5 ng/mL represents a low risk of severe sepsis and/or septic shock.  2. >2 ng/mL represents a high risk of severe sepsis and/or septic shock.    As a Marker for Lower Respiratory Tract Infections that require antibiotic therapy:    PCT on Admission    Antibiotic Therapy       6-12 Hrs later    >0.5                Strongly Recommended  >0.25 - <0.5        Recommended   0.1 - 0.25          Discouraged              Remeasure/reassess PCT  <0.1                Strongly Discouraged     Remeasure/reassess PCT    As 28 day mortality risk marker: \"Change in Procalcitonin Result\" (>80% or <=80%) if Day 0 (or Day 1) and Day 4 values are available. Refer to http://www.[employer]-pct-calculator.com    Change in PCT <=80%  A decrease of PCT levels below or equal to 80% defines a positive change in PCT test result representing a higher risk for 28-day all-cause mortality of patients diagnosed with severe sepsis for septic shock.    Change in PCT >80%  A decrease of PCT levels of more than 80% defines a negative change in PCT result representing a lower risk for 28-day all-cause mortality of patients diagnosed with severe sepsis or septic shock.      C-REACTIVE PROTEIN - Normal   MONONUCLEOSIS SCREEN - Normal    Narrative:     Indicates the absence of Heterophile Antibodies associated with Infectious Mononucleosis.   MAGNESIUM - Normal   TSH - Normal   ETHANOL - Normal    Narrative:     Elevated lactic acid concentration and lactate dehydrogenase(LD) activity may falsely elevate enzymatically determined ethanol levels. Not for legal purposes.    ACETAMINOPHEN LEVEL - Normal   SALICYLATE LEVEL - Normal   FENTANYL, URINE - Normal    Narrative:     " Negative Threshold:      Fentanyl 5 ng/mL     The normal value for the drug tested is negative. This report includes final unconfirmed screening results to be used for medical treatment purposes only. Unconfirmed results must not be used for non-medical purposes such as employment or legal testing. Clinical consideration should be applied to any drug of abuse test, particularly when unconfirmed results are used.          COVID PRE-OP / PRE-PROCEDURE SCREENING ORDER (NO ISOLATION)    Narrative:     The following orders were created for panel order COVID PRE-OP / PRE-PROCEDURE SCREENING ORDER (NO ISOLATION) - Swab, Nasopharynx.  Procedure                               Abnormality         Status                     ---------                               -----------         ------                     COVID-19, FLU A/B, RSV P...[[id number]]  Normal              Final result                 Please view results for these tests on the individual orders.   BLOOD CULTURE   BLOOD CULTURE   BLOOD GAS, ARTERIAL   HIGH SENSITIVITIY TROPONIN T 2HR   LACTIC ACID, REFLEX   CBC AND DIFFERENTIAL    Narrative:     The following orders were created for panel order CBC & Differential.  Procedure                               Abnormality         Status                     ---------                               -----------         ------                     CBC Auto Differential[[id number]]        Abnormal            Final result                 Please view results for these tests on the individual orders.       Meds Given in ED:   Medications   Phosphorus Replacement - Follow Nurse / BPA Driven Protocol (has no administration in time range)   lactated ringers infusion (has no administration in time range)   potassium phosphate 15 mmol in 0.9% normal saline 250 mL IVPB (has no administration in time range)   lactated ringers bolus 1,000 mL (0 mL Intravenous Stopped 5/4/24 1926)   prochlorperazine (COMPAZINE) injection 5 mg (5 mg  Intravenous Given 5/4/24 1601)   diphenhydrAMINE (BENADRYL) injection 25 mg (25 mg Intravenous Given 5/4/24 1601)   acetaminophen (TYLENOL) tablet 1,000 mg (1,000 mg Oral Given 5/4/24 1606)   ketorolac (TORADOL) injection 15 mg (15 mg Intravenous Given 5/4/24 1601)   iopamidol (ISOVUE-370) 76 % injection 100 mL (85 mL Intravenous Given 5/4/24 1640)   gadobenate dimeglumine (MULTIHANCE) injection 20 mL (20 mL Intravenous Given 5/4/24 1734)     lactated ringers, 125 mL/hr         Last NIH score:                                                          Dysphagia screening results:        Genoveva Coma Scale:  No data recorded     CIWA:        Restraint Type:            Isolation Status:  No active isolations

## 2024-05-04 NOTE — DISCHARGE INSTRUCTIONS
Use the provided nausea medicine to help with symptoms.  Maintain good oral hydration which will help prophylax against headaches.  While today's workup was reassuring if your symptoms change or worsen please return to the ED or seek other medical care   Negative. Respiratory: Negative. Cardiovascular: Negative. Gastrointestinal: Negative. Genitourinary: Negative. Musculoskeletal: right sided spastic Cerebral Palsy    Skin: Negative. Neurological: negative for headaches, positive for seizures, positive for developmental delays. Positive for Seizures. Hematological: Negative. Psychiatric/Behavioral: negative for behavioral issues, negative for ADHD     All other systems reviewed and are negative. Past, social, family, and developmental history was reviewed and unchanged.     Objective:   PHYSICAL EXAM:   /60   Pulse 98   Ht 47.5\" (120.7 cm)   Wt 55 lb 12.8 oz (25.3 kg)   BMI 17.39 kg/m²     Neurological: he is alert. He is weak on the right side with slightly decreased muscle tone all over, but ankle spasticity was again noted on exam. His RUE tone was also increased. No cranial deficits noted on exam. He is able to stand with support. He was able to raise both the lower extremities against gravity. The left leg strength was at 4-/5. The right leg strength was at 3-/5. The right ankle dorsiflexion and plantar flexion was weaker. He was sitting in a wheelchair and was cooperative for the exam.       Reflex Scores: 2+ on the left and 3 + on the right. He is noted to be weak on the right side.      Nursing note and vitals reviewed. Constitutional: he appears well-developed and well-nourished. HENT: Mouth/Throat: Mucous membranes are moist.   Eyes: EOM are normal. Pupils are equal, round, and reactive to light. Neck: Normal range of motion. Neck supple. Cardiovascular: Regular rhythm, S1 normal and S2 normal.   Pulmonary/Chest: Effort normal and breath sounds normal.   Lymph Nodes: No significant lymphadenopathy noted. Musculoskeletal: Normal range of motion. Increased tone on the right side noted on exam. Mild curvature of the spine. Neurological: he is alert and rest of the exam is as mentioned above. Skin: Skin is warm and dry. and mother would like to procede. 4. Developmental Delay   5. Mild curvature of the spine, but no single curvature meeting measurement for scoliosis. Plan:   1. Continue Keppra (100 mg/ml) at 700 mg (7 ml) twice daily. 2. Continue Vimpat (10mg/ml)  60 mg (6 ml) twice a day. 3. Continue Zanaflex  4 mg at night. 4. I would recommend Diastat at 5 mg PRN rectally for seizures lasting greater than 3 minutes. 5. I would again like to get an EEG to evaluate for epileptiform activity. 6. he will also benefit from use of Botulinum Toxin injections since the spasticity continues to persist.  In this regard insurance paperwork will be initiated at today's visit and Inocentekaylawindy Castrejon will be scheduled for a visit in the future for administration for injections. 7. Continue involvement in Physical and Occupational therapies. 8. Continue to follow with Hematology. 9. I would like to see him back in 4 months of earlier if needed.      Electronically signed by CLAYTON Bella CNP on 9/20/2018 at 2:30 PM

## 2024-05-05 VITALS
BODY MASS INDEX: 26.37 KG/M2 | RESPIRATION RATE: 18 BRPM | DIASTOLIC BLOOD PRESSURE: 82 MMHG | HEIGHT: 72 IN | WEIGHT: 194.7 LBS | TEMPERATURE: 98 F | SYSTOLIC BLOOD PRESSURE: 157 MMHG | HEART RATE: 80 BPM | OXYGEN SATURATION: 99 %

## 2024-05-05 PROBLEM — R51.9 HEADACHE: Status: RESOLVED | Noted: 2024-05-04 | Resolved: 2024-05-05

## 2024-05-05 PROBLEM — R11.2 INTRACTABLE NAUSEA AND VOMITING: Status: RESOLVED | Noted: 2024-05-04 | Resolved: 2024-05-05

## 2024-05-05 PROBLEM — R07.89 OTHER CHEST PAIN: Status: RESOLVED | Noted: 2024-05-04 | Resolved: 2024-05-05

## 2024-05-05 LAB
ANION GAP SERPL CALCULATED.3IONS-SCNC: 10 MMOL/L (ref 5–15)
BUN SERPL-MCNC: 25 MG/DL (ref 8–23)
BUN/CREAT SERPL: 17.9 (ref 7–25)
CALCIUM SPEC-SCNC: 9 MG/DL (ref 8.6–10.5)
CHLORIDE SERPL-SCNC: 104 MMOL/L (ref 98–107)
CO2 SERPL-SCNC: 26 MMOL/L (ref 22–29)
CREAT SERPL-MCNC: 1.4 MG/DL (ref 0.76–1.27)
EGFRCR SERPLBLD CKD-EPI 2021: 52.4 ML/MIN/1.73
GLUCOSE SERPL-MCNC: 114 MG/DL (ref 65–99)
PHOSPHATE SERPL-MCNC: 3.4 MG/DL (ref 2.5–4.5)
POTASSIUM SERPL-SCNC: 4 MMOL/L (ref 3.5–5.2)
QT INTERVAL: 450 MS
QTC INTERVAL: 468 MS
SODIUM SERPL-SCNC: 140 MMOL/L (ref 136–145)

## 2024-05-05 PROCEDURE — 96376 TX/PRO/DX INJ SAME DRUG ADON: CPT

## 2024-05-05 PROCEDURE — G0378 HOSPITAL OBSERVATION PER HR: HCPCS

## 2024-05-05 PROCEDURE — 25010000002 HEPARIN (PORCINE) PER 1000 UNITS: Performed by: INTERNAL MEDICINE

## 2024-05-05 PROCEDURE — 99239 HOSP IP/OBS DSCHRG MGMT >30: CPT | Performed by: STUDENT IN AN ORGANIZED HEALTH CARE EDUCATION/TRAINING PROGRAM

## 2024-05-05 PROCEDURE — 84100 ASSAY OF PHOSPHORUS: CPT | Performed by: STUDENT IN AN ORGANIZED HEALTH CARE EDUCATION/TRAINING PROGRAM

## 2024-05-05 PROCEDURE — 25810000003 SODIUM CHLORIDE 0.9 % SOLUTION: Performed by: STUDENT IN AN ORGANIZED HEALTH CARE EDUCATION/TRAINING PROGRAM

## 2024-05-05 PROCEDURE — 80048 BASIC METABOLIC PNL TOTAL CA: CPT | Performed by: STUDENT IN AN ORGANIZED HEALTH CARE EDUCATION/TRAINING PROGRAM

## 2024-05-05 PROCEDURE — 96372 THER/PROPH/DIAG INJ SC/IM: CPT

## 2024-05-05 PROCEDURE — 25810000003 LACTATED RINGERS PER 1000 ML: Performed by: STUDENT IN AN ORGANIZED HEALTH CARE EDUCATION/TRAINING PROGRAM

## 2024-05-05 RX ORDER — PANTOPRAZOLE SODIUM 40 MG/1
40 TABLET, DELAYED RELEASE ORAL DAILY
Qty: 30 TABLET | Refills: 0 | Status: SHIPPED | OUTPATIENT
Start: 2024-05-05

## 2024-05-05 RX ADMIN — SODIUM CHLORIDE, POTASSIUM CHLORIDE, SODIUM LACTATE AND CALCIUM CHLORIDE 125 ML/HR: 600; 310; 30; 20 INJECTION, SOLUTION INTRAVENOUS at 02:44

## 2024-05-05 RX ADMIN — POTASSIUM PHOSPHATE, MONOBASIC POTASSIUM PHOSPHATE, DIBASIC 15 MMOL: 224; 236 INJECTION, SOLUTION, CONCENTRATE INTRAVENOUS at 02:39

## 2024-05-05 RX ADMIN — HEPARIN SODIUM 5000 UNITS: 5000 INJECTION INTRAVENOUS; SUBCUTANEOUS at 09:11

## 2024-05-05 RX ADMIN — SODIUM CHLORIDE, POTASSIUM CHLORIDE, SODIUM LACTATE AND CALCIUM CHLORIDE 125 ML/HR: 600; 310; 30; 20 INJECTION, SOLUTION INTRAVENOUS at 11:02

## 2024-05-05 RX ADMIN — ROSUVASTATIN CALCIUM 20 MG: 20 TABLET, COATED ORAL at 09:11

## 2024-05-05 RX ADMIN — PANTOPRAZOLE SODIUM 40 MG: 40 INJECTION, POWDER, FOR SOLUTION INTRAVENOUS at 06:00

## 2024-05-05 NOTE — DISCHARGE SUMMARY
[facility] Medicine Services  DISCHARGE SUMMARY    Patient Name: Chad Ohara  : 1950  MRN: [id number]    Date of Admission: 2024  3:45 PM  Date of Discharge:  2024  Primary Care Physician: Asha Joyce MD    Consults       No orders found for last 30 day(s).            Hospital Course     Presenting Problem: intractable n/v    Active Hospital Problems    Diagnosis  POA    RODRÍGUEZ (acute kidney injury) [N17.9]  Yes    Hypertension [I10]  Yes      Resolved Hospital Problems    Diagnosis Date Resolved POA    **Intractable nausea and vomiting [R11.2] 2024 Yes    Headache [R51.9] 2024 Yes    Other chest pain [R07.89] 2024 Yes          Hospital Course:  Chad Ohara is a 74 y.o. male with HO HTN, HLP and CKD as well as THC use who presents with 3 days of intermittent excruciating headache and nausea and vomiting. He reports daily use of THC.  CTA chest, gallbladder ultrasound and MRI brain were negative for any acute process.  He was treated with supportive care with IV fluids and antiemetics.  He improved significantly and is currently tolerating a p.o. diet and feels back to baseline.  I have encouraged oral hydration especially with his RODRÍGUEZ, dehydration and recent use of contrast.  He has been consult about daily THC use related to hyperemesis.  Will need follow-up with PCP to repeat labs within 1 week.  Currently holding HCTZ and ACE inhibitor.  Avoid NSAIDs.  Per PCP      Discharge Follow Up Recommendations for outpatient labs/diagnostics:   PCP    Day of Discharge     HPI:   Tolerating diet, feels at baseline, wife at beside    Review of Systems  Gen- No fevers, chills  CV- No chest pain, palpitations  Resp- No cough, dyspnea  GI- No N/V/D, abd pain      Vital Signs:   Temp:  [97.6 °F (36.4 °C)-98.6 °F (37 °C)] 98 °F (36.7 °C)  Heart Rate:  [45-98] 80  Resp:  [18] 18  BP: ()/(35-89) 157/82      Physical Exam:  Constitutional: No acute distress,  awake, alert  HENT: NCAT, mucous membranes moist  Respiratory: Clear to auscultation bilaterally, respiratory effort normal   Cardiovascular: RRR, no murmurs, rubs, or gallops  Gastrointestinal: Positive bowel sounds, soft, nontender, nondistended  Musculoskeletal: No bilateral ankle edema  Psychiatric: Appropriate affect, cooperative  Neurologic: Oriented x 3, strength symmetric in all extremities, Cranial Nerves grossly intact to confrontation, speech clear  Skin: No rashes      Pertinent  and/or Most Recent Results     LAB RESULTS:      Lab 05/04/24 2044 05/04/24 1924 05/04/24 1623 05/03/24  1919 05/03/24  1528 05/02/24  0053 05/01/24 2055   WBC  --   --  15.97*  --  16.30*  --  15.56*   HEMOGLOBIN  --   --  13.6  --  13.0  --  13.3   HEMATOCRIT  --   --  39.8  --  37.4*  --  39.2   PLATELETS  --   --  274  --  263  --  266   NEUTROS ABS  --   --  12.78*  --  13.90*  --  13.13*   IMMATURE GRANS (ABS)  --   --  0.12*  --  0.15*  --  0.09*   LYMPHS ABS  --   --  2.13  --  1.45  --  1.78   MONOS ABS  --   --  0.87  --  0.73  --  0.52   EOS ABS  --   --  0.02  --  0.01  --  0.01   MCV  --   --  94.5  --  94.0  --  94.7   CRP  --   --  <0.30  --   --   --   --    PROCALCITONIN  --   --  0.05  --  0.05  --   --    LACTATE  --  1.6 2.7* 1.4 3.0* 1.9 2.6*   D DIMER QUANT 0.73  --   --   --   --   --   --          Lab 05/05/24  1117 05/04/24 1632 05/04/24 1629 05/04/24 1623 05/03/24 1528 05/01/24 2055   SODIUM 140  --   --  138 141 138   POTASSIUM 4.0  --   --  3.7 3.7 4.0   CHLORIDE 104  --   --  99 102 101   CO2 26.0  --   --  25.0 22.0 24.0   ANION GAP 10.0  --   --  14.0 17.0* 13.0   BUN 25*  --   --  30* 33* 27*   CREATININE 1.40* 1.70* 1.70* 1.74* 1.57* 1.27   EGFR 52.4*  --   --  40.4* 45.7* 58.9*   GLUCOSE 114*  --   --  111* 129* 148*   CALCIUM 9.0  --   --  10.1 9.8 9.8   MAGNESIUM  --   --   --  1.9 1.8  --    PHOSPHORUS  --   --   --  1.2* 0.8*  --    TSH  --   --   --  2.010 2.250  --          Lab  05/04/24  1623 05/03/24  1528 05/01/24  2055   TOTAL PROTEIN 7.7 7.4 7.9   ALBUMIN 4.3 4.1 4.3   GLOBULIN 3.4 3.3 3.6   ALT (SGPT) 23 19 19   AST (SGOT) 39 32 24   BILIRUBIN 0.6 0.6 0.5   ALK PHOS 85 76 86   LIPASE 30 25 36         Lab 05/04/24  1830 05/04/24  1623 05/03/24  1528   PROBNP  --  509.1 321.7   HSTROP T 20 19 17                 Lab 05/04/24  1609   PH, ARTERIAL 7.665*   PCO2, ARTERIAL 18.8*   PO2 .0   FIO2 21   HCO3 ART 21.4   BASE EXCESS ART 2.8*   CARBOXYHEMOGLOBIN 0.7     Brief Urine Lab Results  (Last result in the past 365 days)        Color   Clarity   Blood   Leuk Est   Nitrite   Protein   CREAT   Urine HCG        05/04/24 1703 Yellow   Clear   Negative   Negative   Negative   Trace                 Microbiology Results (last 10 days)       Procedure Component Value - Date/Time    COVID PRE-OP / PRE-PROCEDURE SCREENING ORDER (NO ISOLATION) - Swab, Nasopharynx [[id number]]  (Normal) Collected: 05/04/24 1611    Lab Status: Final result Specimen: Swab from Nasopharynx Updated: 05/04/24 1711    Narrative:      The following orders were created for panel order COVID PRE-OP / PRE-PROCEDURE SCREENING ORDER (NO ISOLATION) - Swab, Nasopharynx.  Procedure                               Abnormality         Status                     ---------                               -----------         ------                     COVID-19, FLU A/B, RSV P...[[id number]]  Normal              Final result                 Please view results for these tests on the individual orders.    COVID-19, FLU A/B, RSV PCR 1 HR TAT - Swab, Nasopharynx [[id number]]  (Normal) Collected: 05/04/24 1611    Lab Status: Final result Specimen: Swab from Nasopharynx Updated: 05/04/24 1711     COVID19 Not Detected     Influenza A PCR Not Detected     Influenza B PCR Not Detected     RSV, PCR Not Detected    Narrative:      Fact sheet for providers: https://www.fda.gov/media/376378/download    Fact sheet for patients:  https://www.fda.gov/media/984296/download    Test performed by PCR.    Culture, CSF - Cerebrospinal Fluid, Lumbar Puncture [[id number]] Collected: 05/03/24 1952    Lab Status: Preliminary result Specimen: Cerebrospinal Fluid from Lumbar Puncture Updated: 05/05/24 0800     CSF Culture No growth at 2 days     Gram Stain No WBCs or organisms seen    Meningitis / Encephalitis Panel, PCR - Cerebrospinal Fluid, Lumbar Puncture [[id number]]  (Normal) Collected: 05/03/24 1952    Lab Status: Final result Specimen: Cerebrospinal Fluid from Lumbar Puncture Updated: 05/03/24 2245     ESCHERICHIA COLI K1, PCR Not Detected     HAEMOPHILUS INFLUENZAE, PCR Not Detected     LISTERIA MONOCYTOGENES, PCR Not Detected     NEISSERIA MENINGITIDIS, PCR Not Detected     STREPTOCOCCUS AGALACTIAE, PCR Not Detected     STREPTOCOCCUS PNEUMONIAE, PCR Not Detected     CYTOMEGALOVIRUS (CMV), PCR Not Detected     ENTEROVIRUS, PCR Not Detected     HERPES SIMPLEX VIRUS 1 (HSV-1), PCR Not Detected     HERPES SIMPLEX VIRUS 2 (HSV-2), PCR Not Detected     HUMAN PARECHOVIRUS, PCR Not Detected     VARICELLA ZOSTER VIRUS (VZV), PCR Not Detected     CRYPTOCOCCUS NEOFORMANS / GATTII, PCR Not Detected     HUMAN HERPES VIRUS 6 PCR Not Detected    Blood Culture - Blood, Arm, Right [[id number]]  (Normal) Collected: 05/03/24 1919    Lab Status: Preliminary result Specimen: Blood from Arm, Right Updated: 05/04/24 2102     Blood Culture No growth at 24 hours    Blood Culture - Blood, Arm, Right [[id number]]  (Normal) Collected: 05/03/24 1600    Lab Status: Preliminary result Specimen: Blood from Arm, Right Updated: 05/04/24 1631     Blood Culture No growth at 24 hours    COVID PRE-OP / PRE-PROCEDURE SCREENING ORDER (NO ISOLATION) - Swab, Nasopharynx [[id number]]  (Normal) Collected: 05/03/24 1557    Lab Status: Final result Specimen: Swab from Nasopharynx Updated: 05/03/24 1646    Narrative:      The following orders were created for panel order COVID PRE-OP /  PRE-PROCEDURE SCREENING ORDER (NO ISOLATION) - Swab, Nasopharynx.  Procedure                               Abnormality         Status                     ---------                               -----------         ------                     COVID-19, FLU A/B, RSV P...[[id number]]  Normal              Final result                 Please view results for these tests on the individual orders.    COVID-19, FLU A/B, RSV PCR 1 HR TAT - Swab, Nasopharynx [[id number]]  (Normal) Collected: 05/03/24 1557    Lab Status: Final result Specimen: Swab from Nasopharynx Updated: 05/03/24 1646     COVID19 Not Detected     Influenza A PCR Not Detected     Influenza B PCR Not Detected     RSV, PCR Not Detected    Narrative:      Fact sheet for providers: https://www.fda.gov/media/073442/download    Fact sheet for patients: https://www.fda.gov/media/342625/download    Test performed by PCR.    COVID PRE-OP / PRE-PROCEDURE SCREENING ORDER (NO ISOLATION) - Swab, Nasopharynx [[id number]]  (Normal) Collected: 05/02/24 0007    Lab Status: Final result Specimen: Swab from Nasopharynx Updated: 05/02/24 0038    Narrative:      The following orders were created for panel order COVID PRE-OP / PRE-PROCEDURE SCREENING ORDER (NO ISOLATION) - Swab, Nasopharynx.  Procedure                               Abnormality         Status                     ---------                               -----------         ------                     COVID-19 and FLU A/B PCR...[[id number]]  Normal              Final result                 Please view results for these tests on the individual orders.    COVID-19 and FLU A/B PCR, 1 HR TAT - Swab, Nasopharynx [[id number]]  (Normal) Collected: 05/02/24 0007    Lab Status: Final result Specimen: Swab from Nasopharynx Updated: 05/02/24 0038     COVID19 Not Detected     Influenza A PCR Not Detected     Influenza B PCR Not Detected    Narrative:      Fact sheet for providers: https://www.fda.gov/media/624632/download    Fact sheet  for patients: https://www.fda.gov/media/669895/download    Test performed by PCR.            CT Angiogram Chest Pulmonary Embolism    Addendum Date: 5/4/2024    ADDENDUM #1 ADDENDUM: Sagittal and coronal coronal reconstructions angiographic reconstructions are now available. These images include some reconstruction graphics superimposed on the aorta. No additional pathologic findings are seen, not mentioned in earlier CT scan report. In particular, there is now better evaluation of the bony structures and no evidence of acute bony trauma. Electronically Signed: Diego Baires MD  5/4/2024 6:44 PM EDT  Workstation ID: [id number] ORIGINAL REPORT: CT ANGIOGRAM CHEST PULMONARY EMBOLISM Date of Exam: 5/4/2024 4:27 PM EDT Indication: acute severe low chest pain/epigastric pain, negative abdominal workup yesterday. Comparison: No prior angiographic chest CT scan. Previous low-dose chest CT scan 1/7/2016 recent CTA of the abdomen pelvis 5/3/2024 Technique: Axial CT images were obtained of the chest initially without contrast, subsequently after the uneventful intravenous administration of 85 mL Isovue 370 utilizing pulmonary embolism protocol.  Reconstructed coronal and sagittal images were also  obtained. Automated exposure control and iterative construction methods were used. Findings: Unenhanced scan shows no evidence of intramural hematoma of the thoracic aorta or periaortic inflammation. Angiographic images show good contrast opacification of the thoracic aorta with no evidence of dissection, allowing for the usual cardiac pulsation artifact, and with maximal diameter of 3.6 cm. No aneurysm is seen. There is good contrast opacification of the pulmonary arteries. No filling defects are seen to suggest pulmonary embolic disease. There is no evidence of pericardial or pleural effusion and no evidence of significant mediastinal, hilar, or axillary adenopathy. There is moderate gastroesophageal reflux, with fluid in the esophagus  to a level just below the thoracic inlet. The airways appear to be normally patent. Lungs appear clear of active disease, with what appears to be mild dependent atelectasis in the posterior lower lobes. Included images of the upper abdomen show a small whether of residual contrast in the renal collecting systems from prior study. No significant abnormalities are appreciated of the included portions of the liver, gallbladder, spleen, pancreas, or adrenal  glands. No upper abdominal free air or ascites or adenopathy is seen. Bowel loops are normal in caliber. Bony structures. Impression: 1. No evidence of pulmonary embolic disease, thoracic aortic aneurysm or dissection, pneumonia, or other active chest pathology. 2. Incidentally noted gastroesophageal reflux. Note: Sagittal and coronal reconstruction images are not currently available on PACS. Exam is dictated now in order not to delay reporting. An addendum will be issued if/when reconstruction images become available. Electronically Signed: Diego Baires MD  5/4/2024 4:57 PM EDT  Workstation ID: [id number]    Result Date: 5/4/2024  CT ANGIOGRAM CHEST PULMONARY EMBOLISM Date of Exam: 5/4/2024 4:27 PM EDT Indication: acute severe low chest pain/epigastric pain, negative abdominal workup yesterday. Comparison: No prior angiographic chest CT scan. Previous low-dose chest CT scan 1/7/2016 recent CTA of the abdomen pelvis 5/3/2024 Technique: Axial CT images were obtained of the chest initially without contrast, subsequently after the uneventful intravenous administration of 85 mL Isovue 370 utilizing pulmonary embolism protocol.  Reconstructed coronal and sagittal images were also  obtained. Automated exposure control and iterative construction methods were used. Findings: Unenhanced scan shows no evidence of intramural hematoma of the thoracic aorta or periaortic inflammation. Angiographic images show good contrast opacification of the thoracic aorta with no evidence of  dissection, allowing for the usual cardiac pulsation artifact, and with maximal diameter of 3.6 cm. No aneurysm is seen. There is good contrast opacification of the pulmonary arteries. No filling defects are seen to suggest pulmonary embolic disease. There is no evidence of pericardial or pleural effusion and no evidence of significant mediastinal, hilar, or axillary adenopathy. There is moderate gastroesophageal reflux, with fluid in the esophagus to a level just below the thoracic inlet. The airways appear to be normally patent. Lungs appear clear of active disease, with what appears to be mild dependent atelectasis in the posterior lower lobes. Included images of the upper abdomen show a small whether of residual contrast in the renal collecting systems from prior study. No significant abnormalities are appreciated of the included portions of the liver, gallbladder, spleen, pancreas, or adrenal  glands. No upper abdominal free air or ascites or adenopathy is seen. Bowel loops are normal in caliber. Bony structures.     Impression: 1. No evidence of pulmonary embolic disease, thoracic aortic aneurysm or dissection, pneumonia, or other active chest pathology. 2. Incidentally noted gastroesophageal reflux. Note: Sagittal and coronal reconstruction images are not currently available on PACS. Exam is dictated now in order not to delay reporting. An addendum will be issued if/when reconstruction images become available. Electronically Signed: Diego Baires MD  5/4/2024 4:57 PM EDT  Workstation ID: [id number]    MRI Brain With & Without Contrast    Result Date: 5/4/2024  MRI BRAIN W WO CONTRAST Date of Exam: 5/4/2024 5:12 PM EDT Indication: severe headache with nausea and vomiting 3 days unlike any prior, negative CT's and LP.  Comparison: 5/3/2024 head CT scan Technique:  Routine multiplanar/multisequence sequence images of the brain were obtained before and after the uneventful administration of 20 mL Multihance. Findings:  Previous head CT scan report noted no acute intracranial pathology. Head and neck CTA exam, also from 5/3/2024 noted no significant vascular stenosis. The current study shows no evidence of restricted diffusion to suggest acute infarction. Images are mildly motion degraded. There is age-appropriate generalized cerebral atrophy, and mild central white matter change consistent with minor changes of microvascular encephalopathy. There is a small amount of symmetric increased T2 signal around the occipital horns, at least in part due to chemical shift artifact. This is less extensive than would be expected with hypertension-related PRES syndrome. There is no evidence of mass, mass effect, edema, or evidence of previous infarction. No signal changes are seen to suggest acute or chronic blood products. Sagittal images show the midline structures to appear grossly normal. Included paranasal sinuses and mastoids appear clear. No gross abnormalities are seen in the orbits. There is expected flow signal in the major intracranial arteries and major dural venous sinuses.. Postcontrast images show no evidence of pathologic brain or meningeal enhancement.     Impression: Chronic appearing changes of the aging brain. No evidence of acute intracranial disease is seen. Electronically Signed: Diego Baires MD  5/4/2024 6:17 PM EDT  Workstation ID: [id number]    US Gallbladder    Result Date: 5/4/2024  US GALLBLADDER Date of Exam: 5/4/2024 4:44 PM EDT Indication: severe epigastric pain, negative CT. Comparison: Abdominal CT scan 5/3/2024 Technique: Grayscale and color Doppler ultrasound evaluation of the right upper quadrant was performed. Findings: Pancreas is generally well seen and appears grossly normal. Right and left liver lobes appear normal in morphology and echotexture. There is expected portal vein and hepatic vein flow. No color flow filling defects are appreciated. Gallbladder is distended, typical for fasting state and shows  no evidence of gallbladder wall edema or evidence of gallstones. Common duct is normal in caliber at 3 mm. Right kidney appears normal and measures 11.5 cm in length. No right upper quadrant ascites is seen.     Impression: Negative gallbladder ultrasound. Electronically Signed: Diego Baires MD  5/4/2024 5:33 PM EDT  Workstation ID: [id number]    CT Angiogram Abdomen Pelvis    Result Date: 5/3/2024  CT ANGIOGRAM ABDOMEN PELVIS Date of Exam: 5/3/2024 4:30 PM EDT Indication: severe acute epigastric pain with nausea. Comparison: None available. Technique: CTA of the abdomen and pelvis was performed after the uneventful intravenous administration of 85 cc Isovue-370 IV contrast . Reconstructed coronal and sagittal images were also obtained. In addition, a 3-D volume rendered image was created for interpretation. Automated exposure control and iterative reconstruction methods were used. FINDINGS: Lung bases: Small hiatal hernia. Liver:No masses. No intrahepatic biliary ductal dilatation. Spleen:No masses. No perisplenic hematoma. Pancreas:No pancreatic masses. No evidence of pancreatitis. Gallbladder and common bile duct:No evidence of cholelithiasis. No evidence of cholecystitis. Adrenal glands:No adrenal masses Kidneys and ureters: Tiny hypodensities involving both kidneys statistically likely to represent small renal cysts. No calculi present within the ureters. Normal caliber ureters. Urinary bladder:No urinary bladder wall thickening. No bladder masses. Small bowel:Normal caliber small bowel. Large bowel: Colonic diverticulosis without evidence of diverticulitis. Appendix: Normal GENITOURINARY: Normal prostate Ascites or pneumoperitoneum:None. Adenopathy:None present Osseous structures: Degenerative changes of the hips and lumbar spine. No lytic or blastic disease. Other findings: Arterial phase of the CT angiogram of the abdomen and pelvis shows mild multifocal atheromatous disease without vessel occlusion or stenosis.  No evidence of dissection. No rupture or hemorrhage. Venous phase demonstrates normal renal excretion.     No vessel occlusion or stenosis. Small hiatal hernia. Colonic diverticulosis without evidence of diverticulitis. No aortic dissection or rupture. Electronically Signed: Delano Metzger MD  5/3/2024 5:10 PM EDT  Workstation ID: [id number]    CT Head Without Contrast    Result Date: 5/3/2024  CT HEAD WO CONTRAST, CT ANGIOGRAM NECK, CT ANGIOGRAM HEAD Date of Exam: 5/3/2024 4:30 PM EDT Indication: acute worst headache of life. Comparison: 6/1/2018 Technique: Axial CT images were obtained of the head without contrast administration.  Automated exposure control and iterative construction methods were used. CT angiogram of the head and neck obtained post administration of Isovue-370 IV contrast. Coronal and sagittal reformats are reviewed. 3D and/or MIP reformats were rendered at an independent workstation. Findings: No intracranial hemorrhage. Gray-white matter differentiation is maintained without evidence of an acute infarction. Multiple foci of decreased attenuation are present within the subcortical, deep cerebral, and periventricular white matter consistent with chronic small vessel/microangiopathic ischemic changes. No extra-axial mass or collection. The ventricles and sulci are prominent commensurate with involutional changes. The posterior fossa appears grossly normal. Sellar and suprasellar structures are normal. Orbital and periorbital soft tissues are normal. The paranasal sinuses, ethmoid air cells, and mastoid air cells are aerated. The bony calvarium is intact. FINDINGS: The arch of the aorta is normal. The common carotid arteries are normal. Minimal atheromatous disease of the left carotid bulb. The extracranial internal carotid arteries are normal. Mild atheromatous disease of the intracranial segments of the  internal carotid arteries.. The carotid termini are normal. The visualized branches of the  anterior and middle cerebral arteries appear normal. The vertebral arteries arise from the subclavian arteries. The right vertebral artery is dominant. No evidence of vertebral artery dissection. The vertebral arteries supply the basilar artery. The basilar artery tip is normal. The basilar artery terminates in bilateral posterior cerebral arteries which appear normal. The visualized lung apices are clear. The airway is clear. The thyroid gland is normal. No cervical adenopathy. The infratemporal fossa is normal bilaterally. No intracranial mass or mass effect. No midline shift. No extra-axial mass or collection. Orbital and paranasal soft tissues are normal. The paranasal sinuses are clear. The mastoid air cells are aerated. Degenerative changes of the spine.     Impression: No acute intracranial pathology. Mild atheromatous disease without vessel occlusion or stenosis. No intracranial hemorrhage. Electronically Signed: Delano Metzger MD  5/3/2024 5:02 PM EDT  Workstation ID: [id number]    CT Angiogram Head    Result Date: 5/3/2024  CT HEAD WO CONTRAST, CT ANGIOGRAM NECK, CT ANGIOGRAM HEAD Date of Exam: 5/3/2024 4:30 PM EDT Indication: acute worst headache of life. Comparison: 6/1/2018 Technique: Axial CT images were obtained of the head without contrast administration.  Automated exposure control and iterative construction methods were used. CT angiogram of the head and neck obtained post administration of Isovue-370 IV contrast. Coronal and sagittal reformats are reviewed. 3D and/or MIP reformats were rendered at an independent workstation. Findings: No intracranial hemorrhage. Gray-white matter differentiation is maintained without evidence of an acute infarction. Multiple foci of decreased attenuation are present within the subcortical, deep cerebral, and periventricular white matter consistent with chronic small vessel/microangiopathic ischemic changes. No extra-axial mass or collection. The ventricles and sulci  are prominent commensurate with involutional changes. The posterior fossa appears grossly normal. Sellar and suprasellar structures are normal. Orbital and periorbital soft tissues are normal. The paranasal sinuses, ethmoid air cells, and mastoid air cells are aerated. The bony calvarium is intact. FINDINGS: The arch of the aorta is normal. The common carotid arteries are normal. Minimal atheromatous disease of the left carotid bulb. The extracranial internal carotid arteries are normal. Mild atheromatous disease of the intracranial segments of the  internal carotid arteries.. The carotid termini are normal. The visualized branches of the anterior and middle cerebral arteries appear normal. The vertebral arteries arise from the subclavian arteries. The right vertebral artery is dominant. No evidence of vertebral artery dissection. The vertebral arteries supply the basilar artery. The basilar artery tip is normal. The basilar artery terminates in bilateral posterior cerebral arteries which appear normal. The visualized lung apices are clear. The airway is clear. The thyroid gland is normal. No cervical adenopathy. The infratemporal fossa is normal bilaterally. No intracranial mass or mass effect. No midline shift. No extra-axial mass or collection. Orbital and paranasal soft tissues are normal. The paranasal sinuses are clear. The mastoid air cells are aerated. Degenerative changes of the spine.     Impression: No acute intracranial pathology. Mild atheromatous disease without vessel occlusion or stenosis. No intracranial hemorrhage. Electronically Signed: Delano Metzger MD  5/3/2024 5:02 PM EDT  Workstation ID: [id number]    CT Angiogram Neck    Result Date: 5/3/2024  CT HEAD WO CONTRAST, CT ANGIOGRAM NECK, CT ANGIOGRAM HEAD Date of Exam: 5/3/2024 4:30 PM EDT Indication: acute worst headache of life. Comparison: 6/1/2018 Technique: Axial CT images were obtained of the head without contrast administration.  Automated  exposure control and iterative construction methods were used. CT angiogram of the head and neck obtained post administration of Isovue-370 IV contrast. Coronal and sagittal reformats are reviewed. 3D and/or MIP reformats were rendered at an independent workstation. Findings: No intracranial hemorrhage. Gray-white matter differentiation is maintained without evidence of an acute infarction. Multiple foci of decreased attenuation are present within the subcortical, deep cerebral, and periventricular white matter consistent with chronic small vessel/microangiopathic ischemic changes. No extra-axial mass or collection. The ventricles and sulci are prominent commensurate with involutional changes. The posterior fossa appears grossly normal. Sellar and suprasellar structures are normal. Orbital and periorbital soft tissues are normal. The paranasal sinuses, ethmoid air cells, and mastoid air cells are aerated. The bony calvarium is intact. FINDINGS: The arch of the aorta is normal. The common carotid arteries are normal. Minimal atheromatous disease of the left carotid bulb. The extracranial internal carotid arteries are normal. Mild atheromatous disease of the intracranial segments of the  internal carotid arteries.. The carotid termini are normal. The visualized branches of the anterior and middle cerebral arteries appear normal. The vertebral arteries arise from the subclavian arteries. The right vertebral artery is dominant. No evidence of vertebral artery dissection. The vertebral arteries supply the basilar artery. The basilar artery tip is normal. The basilar artery terminates in bilateral posterior cerebral arteries which appear normal. The visualized lung apices are clear. The airway is clear. The thyroid gland is normal. No cervical adenopathy. The infratemporal fossa is normal bilaterally. No intracranial mass or mass effect. No midline shift. No extra-axial mass or collection. Orbital and paranasal soft  tissues are normal. The paranasal sinuses are clear. The mastoid air cells are aerated. Degenerative changes of the spine.     Impression: No acute intracranial pathology. Mild atheromatous disease without vessel occlusion or stenosis. No intracranial hemorrhage. Electronically Signed: Delano Metzger MD  5/3/2024 5:02 PM EDT  Workstation ID: [id number]    XR Chest 1 View    Result Date: 5/3/2024  PORTABLE CHEST HISTORY: Precordial chest pain. COMPARISON: None. FINDINGS: The heart is normal in size. The mediastinum is unremarkable. The lungs are clear. There is no pneumothorax. There is a surgical anchor in the left glenoid.    No acute cardiopulmonary process. Images reviewed, interpreted, and dictated by Dr. Parker Parker. Transcribed by Nash Orona PA-C.    CT Head Without Contrast    Result Date: 5/2/2024  CT HEAD WO CONTRAST Date of Exam: 5/2/2024 12:19 AM EDT Indication: headache. Comparison: CT scan the head dated 6/1/2018 Technique: Axial CT images were obtained of the head without contrast administration.  Automated exposure control and iterative construction methods were used. Findings: There is no evidence of hemorrhage. There is no mass effect or midline shift. There is no extracerebral collection. Ventricles are normal in size and configuration for patient's stated age.  Posterior fossa is within normal limits. Calvarium and skull base appear intact.   Visualized sinuses show no air fluid levels. Visualized orbits are unremarkable.     Impression: No acute intracranial process. Electronically Signed: Francisco J Collins MD  5/2/2024 12:32 AM EDT  Workstation ID: [id number]    CT Abdomen Pelvis With Contrast    Result Date: 5/1/2024  CT ABDOMEN PELVIS W CONTRAST Date of Exam: 5/1/2024 10:29 PM EDT Indication: LLQ abd pain. Comparison: 6/1/2018 Technique: Axial CT images were obtained of the abdomen and pelvis following the uneventful intravenous administration of 80 cc Isovue-300 . Reconstructed coronal and sagittal  images were also obtained. Automated exposure control and iterative construction methods were used. FINDINGS: Lung bases: Stable 4 mm left lower lobe since 6/1/2018. Small hiatal hernia. Liver:No masses. No intrahepatic biliary ductal dilatation. Spleen:No masses. No perisplenic hematoma. Pancreas:No pancreatic masses. No evidence of pancreatitis. Gallbladder and common bile duct:No evidence of cholelithiasis. No evidence of cholecystitis. Adrenal glands:No adrenal masses Kidneys and ureters:No kidney stones. No renal masses.No calculi present within the ureters. Normal caliber ureters. Urinary bladder:No urinary bladder wall thickening. No bladder masses. Small bowel:Normal caliber small bowel. Large bowel: Extensive colonic diverticulosis without evidence of diverticulitis. Appendix: Normal GENITOURINARY: Normal prostate Ascites or pneumoperitoneum:None. Adenopathy:None present Osseous structures:Normal Other findings: Atheromatous disease of the abdominal aorta and visualized branches.     No acute abdominopelvic pathology. Colonic diverticulosis without evidence of diverticulitis. Electronically Signed: Delano Metzger MD  5/1/2024 10:42 PM EDT  Workstation ID: [id number]     Results for orders placed during the hospital encounter of 07/27/23    Duplex venous lower extremity left CAR    Interpretation Summary    Sub-acute left lower extremity superficial thrombophlebitis noted in the small saphenous and varicosity (below knee).    All other left sided veins appeared normal, there is no evidence of DVT.      Results for orders placed during the hospital encounter of 07/27/23    Duplex venous lower extremity left CAR    Interpretation Summary    Sub-acute left lower extremity superficial thrombophlebitis noted in the small saphenous and varicosity (below knee).    All other left sided veins appeared normal, there is no evidence of DVT.      Results for orders placed during the hospital encounter of 05/24/17    Adult  Transthoracic Echo Complete    Interpretation Summary  · Left ventricular systolic function is normal. Estimated EF = 55%.  · Right ventricular cavity is mildly dilated.  · Mild tricuspid valve regurgitation is present.  · saline study negative for pfo      Plan for Follow-up of Pending Labs/Results: f/u w PCP  Pending Labs       Order Current Status    Blood Culture - Blood, Arm, Right In process    Blood Culture - Blood, Hand, Right In process    Phosphorus In process          Discharge Details        Discharge Medications        New Medications        Instructions Start Date   pantoprazole 40 MG EC tablet  Commonly known as: Protonix   40 mg, Oral, Daily             Continue These Medications        Instructions Start Date   aspirin 81 MG EC tablet   81 mg, Oral, Daily      bisoprolol 10 MG tablet  Commonly known as: ZEBeta   Take 1 tablet by mouth Daily.      multivitamin with minerals tablet tablet   Take 1 tablet by mouth Daily.      ondansetron ODT 4 MG disintegrating tablet  Commonly known as: ZOFRAN-ODT   4 mg, Translingual, 4 Times Daily PRN      rosuvastatin 20 MG tablet  Commonly known as: CRESTOR   Take 1 tablet by mouth Daily.      tadalafil 5 MG tablet  Commonly known as: CIALIS   5 mg, Oral, Daily PRN             Stop These Medications      ibuprofen 600 MG tablet  Commonly known as: ADVIL,MOTRIN     lisinopril-hydrochlorothiazide 20-25 MG per tablet  Commonly known as: PRINZIDE,ZESTORETIC              No Known Allergies      Discharge Disposition:  home    Diet:  Hospital:  Diet Order   Procedures    Diet: Liquid; Clear Liquid; Fluid Consistency: Thin (IDDSI 0)            Activity:  as tolerated    Restrictions or Other Recommendations:  none       CODE STATUS:    Code Status and Medical Interventions:   Ordered at: 05/04/24 6756     Code Status (Patient has no pulse and is not breathing):    CPR (Attempt to Resuscitate)     Medical Interventions (Patient has pulse or is breathing):    Full Support        No future appointments.              Whitley Pierce MD  05/05/24      Time Spent on Discharge:  I spent  45  minutes on this discharge activity which included: face-to-face encounter with the patient, reviewing the data in the system, coordination of the care with the nursing staff as well as consultants, documentation, and entering orders.

## 2024-05-05 NOTE — CASE MANAGEMENT/SOCIAL WORK
Continued Stay Note  [facility]     Patient Name: Chad Ohara  MRN: [id number]  Today's Date: 5/5/2024    Admit Date: 5/4/2024    Plan: home   Discharge Plan       Row Name 05/05/24 1222       Plan    Plan home    Final Discharge Disposition Code 01 - home or self-care                   Discharge Codes    No documentation.                 Expected Discharge Date and Time       Expected Discharge Date Expected Discharge Time    May 5, 2024               Francoise Chakraborty RN

## 2024-05-06 LAB
BACTERIA SPEC AEROBE CULT: NORMAL
GRAM STN SPEC: NORMAL

## 2024-05-07 LAB
EV RNA SPEC QL NAA+PROBE: NEGATIVE
HSV1 DNA CSF QL NAA+PROBE: NEGATIVE
HSV2 DNA CSF QL NAA+PROBE: NEGATIVE

## 2024-05-08 LAB
BACTERIA SPEC AEROBE CULT: NORMAL
BACTERIA SPEC AEROBE CULT: NORMAL

## 2024-05-09 LAB
BACTERIA SPEC AEROBE CULT: NORMAL
BACTERIA SPEC AEROBE CULT: NORMAL

## 2024-11-10 ENCOUNTER — APPOINTMENT (OUTPATIENT)
Dept: CT IMAGING | Facility: HOSPITAL | Age: 74
End: 2024-11-10
Payer: MEDICARE

## 2024-11-10 ENCOUNTER — HOSPITAL ENCOUNTER (EMERGENCY)
Facility: HOSPITAL | Age: 74
Discharge: HOME | End: 2024-11-10
Attending: EMERGENCY MEDICINE | Admitting: EMERGENCY MEDICINE
Payer: MEDICARE

## 2024-11-10 VITALS
DIASTOLIC BLOOD PRESSURE: 83 MMHG | HEART RATE: 75 BPM | TEMPERATURE: 97.8 F | BODY MASS INDEX: 26.51 KG/M2 | RESPIRATION RATE: 22 BRPM | WEIGHT: 200 LBS | SYSTOLIC BLOOD PRESSURE: 137 MMHG | HEIGHT: 73 IN | OXYGEN SATURATION: 92 %

## 2024-11-10 DIAGNOSIS — Z86.79 HISTORY OF HYPERTENSION: ICD-10-CM

## 2024-11-10 DIAGNOSIS — R51.9 ACUTE NONINTRACTABLE HEADACHE, UNSPECIFIED HEADACHE TYPE: Primary | ICD-10-CM

## 2024-11-10 DIAGNOSIS — D72.829 LEUKOCYTOSIS, UNSPECIFIED TYPE: ICD-10-CM

## 2024-11-10 LAB
ALBUMIN SERPL-MCNC: 4.7 G/DL (ref 3.5–5.2)
ALBUMIN/GLOB SERPL: 1.3 G/DL
ALP SERPL-CCNC: 94 U/L (ref 39–117)
ALT SERPL W P-5'-P-CCNC: 20 U/L (ref 1–41)
ANION GAP SERPL CALCULATED.3IONS-SCNC: 20 MMOL/L (ref 5–15)
AST SERPL-CCNC: 29 U/L (ref 1–40)
BASOPHILS # BLD AUTO: 0.05 10*3/MM3 (ref 0–0.2)
BASOPHILS NFR BLD AUTO: 0.3 % (ref 0–1.5)
BILIRUB SERPL-MCNC: 0.5 MG/DL (ref 0–1.2)
BUN SERPL-MCNC: 23 MG/DL (ref 8–23)
BUN/CREAT SERPL: 16.4 (ref 7–25)
CALCIUM SPEC-SCNC: 10.1 MG/DL (ref 8.6–10.5)
CHLORIDE SERPL-SCNC: 101 MMOL/L (ref 98–107)
CO2 SERPL-SCNC: 21 MMOL/L (ref 22–29)
CREAT BLDA-MCNC: 1.4 MG/DL (ref 0.6–1.3)
CREAT SERPL-MCNC: 1.4 MG/DL (ref 0.76–1.27)
DEPRECATED RDW RBC AUTO: 46.7 FL (ref 37–54)
EGFRCR SERPLBLD CKD-EPI 2021: 52.4 ML/MIN/1.73
EOSINOPHIL # BLD AUTO: 0.01 10*3/MM3 (ref 0–0.4)
EOSINOPHIL NFR BLD AUTO: 0.1 % (ref 0.3–6.2)
ERYTHROCYTE [DISTWIDTH] IN BLOOD BY AUTOMATED COUNT: 13.7 % (ref 12.3–15.4)
GLOBULIN UR ELPH-MCNC: 3.7 GM/DL
GLUCOSE SERPL-MCNC: 110 MG/DL (ref 65–99)
HCT VFR BLD AUTO: 44 % (ref 37.5–51)
HGB BLD-MCNC: 15.5 G/DL (ref 13–17.7)
IMM GRANULOCYTES # BLD AUTO: 0.18 10*3/MM3 (ref 0–0.05)
IMM GRANULOCYTES NFR BLD AUTO: 0.9 % (ref 0–0.5)
LYMPHOCYTES # BLD AUTO: 1.51 10*3/MM3 (ref 0.7–3.1)
LYMPHOCYTES NFR BLD AUTO: 7.9 % (ref 19.6–45.3)
MCH RBC QN AUTO: 32.2 PG (ref 26.6–33)
MCHC RBC AUTO-ENTMCNC: 35.2 G/DL (ref 31.5–35.7)
MCV RBC AUTO: 91.5 FL (ref 79–97)
MONOCYTES # BLD AUTO: 0.7 10*3/MM3 (ref 0.1–0.9)
MONOCYTES NFR BLD AUTO: 3.6 % (ref 5–12)
NEUTROPHILS NFR BLD AUTO: 16.78 10*3/MM3 (ref 1.7–7)
NEUTROPHILS NFR BLD AUTO: 87.2 % (ref 42.7–76)
NRBC BLD AUTO-RTO: 0 /100 WBC (ref 0–0.2)
PLATELET # BLD AUTO: 289 10*3/MM3 (ref 140–450)
PMV BLD AUTO: 9.3 FL (ref 6–12)
POTASSIUM SERPL-SCNC: 3.5 MMOL/L (ref 3.5–5.2)
PROT SERPL-MCNC: 8.4 G/DL (ref 6–8.5)
RBC # BLD AUTO: 4.81 10*6/MM3 (ref 4.14–5.8)
SODIUM SERPL-SCNC: 142 MMOL/L (ref 136–145)
WBC NRBC COR # BLD AUTO: 19.23 10*3/MM3 (ref 3.4–10.8)

## 2024-11-10 PROCEDURE — 25010000002 PROCHLORPERAZINE 10 MG/2ML SOLUTION: Performed by: EMERGENCY MEDICINE

## 2024-11-10 PROCEDURE — 70450 CT HEAD/BRAIN W/O DYE: CPT

## 2024-11-10 PROCEDURE — 99285 EMERGENCY DEPT VISIT HI MDM: CPT

## 2024-11-10 PROCEDURE — 96375 TX/PRO/DX INJ NEW DRUG ADDON: CPT

## 2024-11-10 PROCEDURE — 25010000002 DEXAMETHASONE PER 1 MG: Performed by: EMERGENCY MEDICINE

## 2024-11-10 PROCEDURE — 25010000002 DIPHENHYDRAMINE PER 50 MG: Performed by: EMERGENCY MEDICINE

## 2024-11-10 PROCEDURE — 96374 THER/PROPH/DIAG INJ IV PUSH: CPT

## 2024-11-10 PROCEDURE — 70496 CT ANGIOGRAPHY HEAD: CPT

## 2024-11-10 PROCEDURE — 25510000001 IOPAMIDOL PER 1 ML: Performed by: EMERGENCY MEDICINE

## 2024-11-10 PROCEDURE — 80053 COMPREHEN METABOLIC PANEL: CPT | Performed by: EMERGENCY MEDICINE

## 2024-11-10 PROCEDURE — 85025 COMPLETE CBC W/AUTO DIFF WBC: CPT | Performed by: EMERGENCY MEDICINE

## 2024-11-10 RX ORDER — DROPERIDOL 2.5 MG/ML
2.5 INJECTION, SOLUTION INTRAMUSCULAR; INTRAVENOUS ONCE
Status: DISCONTINUED | OUTPATIENT
Start: 2024-11-10 | End: 2024-11-10

## 2024-11-10 RX ORDER — DEXAMETHASONE SODIUM PHOSPHATE 10 MG/ML
10 INJECTION INTRAMUSCULAR; INTRAVENOUS ONCE
Status: COMPLETED | OUTPATIENT
Start: 2024-11-10 | End: 2024-11-10

## 2024-11-10 RX ORDER — DIPHENHYDRAMINE HYDROCHLORIDE 50 MG/ML
25 INJECTION INTRAMUSCULAR; INTRAVENOUS ONCE
Status: COMPLETED | OUTPATIENT
Start: 2024-11-10 | End: 2024-11-10

## 2024-11-10 RX ORDER — PROCHLORPERAZINE EDISYLATE 5 MG/ML
10 INJECTION INTRAMUSCULAR; INTRAVENOUS ONCE
Status: COMPLETED | OUTPATIENT
Start: 2024-11-10 | End: 2024-11-10

## 2024-11-10 RX ORDER — IOPAMIDOL 755 MG/ML
80 INJECTION, SOLUTION INTRAVASCULAR
Status: COMPLETED | OUTPATIENT
Start: 2024-11-10 | End: 2024-11-10

## 2024-11-10 RX ADMIN — DIPHENHYDRAMINE HYDROCHLORIDE 25 MG: 50 INJECTION INTRAMUSCULAR; INTRAVENOUS at 14:21

## 2024-11-10 RX ADMIN — IOPAMIDOL 80 ML: 755 INJECTION, SOLUTION INTRAVENOUS at 16:23

## 2024-11-10 RX ADMIN — PROCHLORPERAZINE EDISYLATE 10 MG: 5 INJECTION INTRAMUSCULAR; INTRAVENOUS at 14:21

## 2024-11-10 RX ADMIN — DEXAMETHASONE SODIUM PHOSPHATE 10 MG: 10 INJECTION INTRAMUSCULAR; INTRAVENOUS at 14:21

## 2024-11-10 NOTE — ED PROVIDER NOTES
[place]    EMERGENCY DEPARTMENT ENCOUNTER      Pt Name: Chad Ohara  MRN: [id number]  YOB: 1950  Date of evaluation: 11/10/2024  Provider: Negrito Gao MD    CHIEF COMPLAINT       Chief Complaint   Patient presents with    Headache         HISTORY OF PRESENT ILLNESS   Chad Ohara is a 74 y.o. male who presents to the emergency department with acute headache that began yesterday.  Progressively worsening bifrontal headache that is throbbing with some associated nausea and vomiting.  Patient has prior history of similar episodes and was seen here earlier this year for the same.  This feels the same as the last episode.  Denies any associated neck pain or stiffness as well as any neurologic symptoms including any visual changes, peripheral paresthesias, weakness, numbness, or difficulty ambulating.  Denies neck pain, stiffness.      Nursing notes were reviewed.    REVIEW OF SYSTEMS     ROS:  A chief complaint appropriate review of systems was completed and is negative except as noted in the HPI.      PAST MEDICAL HISTORY     Past Medical History:   Diagnosis Date    Hypertension     NSVT (nonsustained ventricular tachycardia) 06/20/2017    Stress test 6/27/17: Low risk study, no  Ischemic changes reported, fixed defect anteroseptal scar.  EF 74%      Palpitations          SURGICAL HISTORY       Past Surgical History:   Procedure Laterality Date    POPLITEAL VENOUS ANEURYSM REPAIR Left 2012    TONSILLECTOMY           CURRENT MEDICATIONS     No current facility-administered medications for this encounter.    Current Outpatient Medications:     aspirin 81 MG EC tablet, Take 1 tablet by mouth Daily., Disp: , Rfl:     bisoprolol (ZEBeta) 10 MG tablet, Take 1 tablet by mouth Daily., Disp: , Rfl:     multivitamin with minerals (CENTRUM ADULTS PO), Take 1 tablet by mouth Daily., Disp: , Rfl:     ondansetron ODT (ZOFRAN-ODT) 4 MG disintegrating tablet, Place 1 tablet on the tongue 4 (Four) Times a Day As  Needed for Nausea or Vomiting., Disp: 12 tablet, Rfl: 0    pantoprazole (Protonix) 40 MG EC tablet, Take 1 tablet by mouth Daily., Disp: 30 tablet, Rfl: 0    rosuvastatin (CRESTOR) 20 MG tablet, Take 1 tablet by mouth Daily., Disp: , Rfl:     tadalafil (CIALIS) 5 MG tablet, Take 1 tablet by mouth Daily As Needed for Erectile Dysfunction., Disp: , Rfl:     ALLERGIES     Patient has no known allergies.    FAMILY HISTORY       Family History   Problem Relation Age of Onset    Cancer Mother         breast CA    Cancer Sister     Stroke Father 62          SOCIAL HISTORY       Social History     Socioeconomic History    Marital status:    Tobacco Use    Smoking status: Former     Current packs/day: 0.00     Average packs/day: 1 pack/day for 30.0 years (30.0 ttl pk-yrs)     Types: Cigarettes     Start date:      Quit date:      Years since quittin.8    Smokeless tobacco: Never   Vaping Use    Vaping status: Never Used   Substance and Sexual Activity    Alcohol use: Yes     Comment: on occasion    Drug use: Yes     Types: Marijuana    Sexual activity: Defer         PHYSICAL EXAM    (up to 7 for level 4, 8 or more for level 5)     Vitals:    11/10/24 1353 11/10/24 1355 11/10/24 1430 11/10/24 1500   BP: (!) 159/103  (!) 187/104 137/83   BP Location:       Patient Position:       Pulse:  78 76 75   Resp:       Temp:       TempSrc:       SpO2:  100% 100% 92%   Weight:       Height:           General: Awake, alert, no acute distress.  HEENT: Conjunctivae normal.  Neck: Trachea midline.  There is no nuchal rigidity.  Cardiac: Heart regular rate, rhythm, no murmurs, rubs, or gallops  Lungs: Lungs are clear to auscultation, there is no wheezing, rhonchi, or rales. There is no use of accessory muscles.  Chest wall: There is no tenderness to palpation over the chest wall or over ribs  Abdomen: Abdomen is soft, nontender, nondistended. There are no firm or pulsatile masses, no rebound rigidity or guarding.    Musculoskeletal: No deformity.  Neuro: Alert and oriented x4.  Cranial nerves II through XII are grossly intact.  There are no visual field deficits.  Cerebellar function intact with finger-to-nose and heel-to-shin testing.  Patient observed ambulating by myself and there is no evidence of ataxia or other gait abnormality.  Motor strength is intact in the face and strength is 5 out of 5 in all 4 extremities without any asymmetry.  Sensation to light touch is intact in all 4 extremities without any asymmetry.  Dermatology: Skin is warm and dry  Psych: Mentation is grossly normal, cognition is grossly normal. Affect is appropriate.        DIAGNOSTIC RESULTS     EKG: All EKGs are interpreted by the Emergency Department Physician who either signs or Co-signs this chart in the absence of a cardiologist.    No orders to display         RADIOLOGY:   [x] Radiologist's Report Reviewed:  CT Head Without Contrast   Final Result   Impression:   1.No acute intracranial process identified.   2.Mild paranasal sinus mucosal disease.            Electronically Signed: Dhruv Martin MD     11/10/2024 4:40 PM EST     Workstation ID: [id number]      CT Angiogram Head   Final Result   Impression:   Major arterial vasculature within head appears widely patent, with no thrombus or aneurysm.            Electronically Signed: Dhruv Martin MD     11/10/2024 4:49 PM EST     Workstation ID: [id number]          I ordered and independently reviewed the above noted radiographic studies.        LABS:    I have reviewed and interpreted all of the currently available lab results from this visit (if applicable):  Results for orders placed or performed during the hospital encounter of 11/10/24   Comprehensive Metabolic Panel    Collection Time: 11/10/24  1:50 PM    Specimen: Blood   Result Value Ref Range    Glucose 110 (H) 65 - 99 mg/dL    BUN 23 8 - 23 mg/dL    Creatinine 1.40 (H) 0.76 - 1.27 mg/dL    Sodium 142 136 - 145 mmol/L    Potassium 3.5  3.5 - 5.2 mmol/L    Chloride 101 98 - 107 mmol/L    CO2 21.0 (L) 22.0 - 29.0 mmol/L    Calcium 10.1 8.6 - 10.5 mg/dL    Total Protein 8.4 6.0 - 8.5 g/dL    Albumin 4.7 3.5 - 5.2 g/dL    ALT (SGPT) 20 1 - 41 U/L    AST (SGOT) 29 1 - 40 U/L    Alkaline Phosphatase 94 39 - 117 U/L    Total Bilirubin 0.5 0.0 - 1.2 mg/dL    Globulin 3.7 gm/dL    A/G Ratio 1.3 g/dL    BUN/Creatinine Ratio 16.4 7.0 - 25.0    Anion Gap 20.0 (H) 5.0 - 15.0 mmol/L    eGFR 52.4 (L) >60.0 mL/min/1.73   CBC Auto Differential    Collection Time: 11/10/24  1:50 PM    Specimen: Blood   Result Value Ref Range    WBC 19.23 (H) 3.40 - 10.80 10*3/mm3    RBC 4.81 4.14 - 5.80 10*6/mm3    Hemoglobin 15.5 13.0 - 17.7 g/dL    Hematocrit 44.0 37.5 - 51.0 %    MCV 91.5 79.0 - 97.0 fL    MCH 32.2 26.6 - 33.0 pg    MCHC 35.2 31.5 - 35.7 g/dL    RDW 13.7 12.3 - 15.4 %    RDW-SD 46.7 37.0 - 54.0 fl    MPV 9.3 6.0 - 12.0 fL    Platelets 289 140 - 450 10*3/mm3    Neutrophil % 87.2 (H) 42.7 - 76.0 %    Lymphocyte % 7.9 (L) 19.6 - 45.3 %    Monocyte % 3.6 (L) 5.0 - 12.0 %    Eosinophil % 0.1 (L) 0.3 - 6.2 %    Basophil % 0.3 0.0 - 1.5 %    Immature Grans % 0.9 (H) 0.0 - 0.5 %    Neutrophils, Absolute 16.78 (H) 1.70 - 7.00 10*3/mm3    Lymphocytes, Absolute 1.51 0.70 - 3.10 10*3/mm3    Monocytes, Absolute 0.70 0.10 - 0.90 10*3/mm3    Eosinophils, Absolute 0.01 0.00 - 0.40 10*3/mm3    Basophils, Absolute 0.05 0.00 - 0.20 10*3/mm3    Immature Grans, Absolute 0.18 (H) 0.00 - 0.05 10*3/mm3    nRBC 0.0 0.0 - 0.2 /100 WBC   POCT, Creatinine    Collection Time: 11/10/24  2:24 PM    Specimen: Blood   Result Value Ref Range    Creatinine 1.40 (A) 0.60 - 1.30 mg/dL        If labs were ordered, I independently reviewed the results and considered them in treating the patient.      EMERGENCY DEPARTMENT COURSE and DIFFERENTIAL DIAGNOSIS/MDM:   Vitals:  AS OF 19:28 EST    BP - 137/83  HR - 75  TEMP - 97.8 °F (36.6 °C) (Oral)  O2 SATS - 92%        Discussion below represents my  analysis of pertinent findings related to patient's condition, differential diagnosis, treatment plan and final disposition.      Differential diagnosis:  The differential diagnosis associated with the patient's presentation includes: Migraine, tension headache, cluster headache, subarachnoid hemorrhage, intracranial hemorrhage, intracranial mass      Independent interpretations (ECG/rhythm strip/X-ray/US/CT scan): Dependently interpreted the patient's head CT and cardiac monitor.  No evidence of ICH and the patient is in sinus rhythm.      Additional sources:  Discussed/obtained information from independent historians:   [] Spouse:   [] Parent:   [] Friend:   [] EMS:   [] Other:  External (non-ED) record review:   [] Inpatient record:   [] Office record:   [] Outpatient record:   [] Prior Outpatient labs:   [] Prior Outpatient radiology:   [] Primary Care record:   [] Outside ED record:   [x] Other: I reviewed discharge summary from May 5 of this year.  Patient admitted for headache and intractable vomiting.  Had extensive workup including negative LP and MRI brain.      Patient's care impacted by:   [] Diabetes   [x] Hypertension   [] Coronary Artery Disease   [] Cancer   [] Other:     Care significantly affected by Social Determinants of Health (housing and economic circumstances, unemployment)    [] Yes     [x] No   If yes, Patient's care significantly limited by  Social Determinants of Health including:    [] Inadequate housing    [] Low income    [] Alcoholism and drug addiction in family    [] Problems related to primary support group    [] Unemployment    [] Problems related to employment    [] Other Social Determinants of Health:       I considered prescription management with:    [] Pain medication:   [] Antiviral:   [] Antibiotic:   [x] Other: Patient given IV Compazine, Benadryl, Decadron with complete relief of headache    ED Course:    ED Course as of 11/10/24 1928   Sun Nov 10, 2024   1711 On  reexamination, the patient feels significantly better.  Symptoms have completely resolved.  On review of labs, patient has chronic leukocytosis.  I discussed this with the patient and his wife at bedside.  They report that patient has had chronic leukocytosis for a long time and has seen specialists without any underlying cause identified.  He has no nuchal rigidity or fever and today's episode feels similar to previous episodes.  Low clinical suspicion for CNS infection.  I did discuss, however, that we cannot rule this out without lumbar puncture.  Patient has had 1 previously and does not wish to have 1 today, especially considering his symptoms are resolved.  Will refer to neurology for follow-up. [NS]      ED Course User Index  [NS] Negrito Gao MD             I had a discussion with the patient/family regarding diagnosis, diagnostic results, treatment plan, and medications.  The patient/family indicated understanding of these instructions.  I spent adequate time at the bedside preceding discharge necessary to personally discuss the aftercare instructions, giving patient education, providing explanations of the results of our evaluations/findings, and my decision making to assure that the patient/family understand the plan of care.  Time was allotted to answer questions at that time and throughout the ED course.  Emphasis was placed on timely follow-up after discharge.  I also discussed the potential for the development of an acute emergent condition requiring further evaluation, admission, or even surgical intervention. I discussed that we found nothing during the visit today indicating the need for further workup, admission, or the presence of an unstable medical condition.  I encouraged the patient to return to the emergency department immediately for ANY concerns, worsening, new complaints, or if symptoms persist and unable to seek follow-up in a timely fashion.  The patient/family expressed  understanding and agreement with this plan.  The patient will follow-up with their PCP in 1-2 days for reevaluation.           PROCEDURES:  Procedures    CRITICAL CARE TIME        FINAL IMPRESSION      1. Acute nonintractable headache, unspecified headache type    2. History of hypertension    3. Leukocytosis, unspecified type          DISPOSITION/PLAN     ED Disposition       ED Disposition   Discharge    Condition   Stable    Comment   --                 Comment: Please note this report has been produced using speech recognition software.      Negrito Gao MD  Attending Emergency Physician             Negrito Gao MD  11/10/24 6501